# Patient Record
Sex: MALE | Race: WHITE | Employment: OTHER | URBAN - METROPOLITAN AREA
[De-identification: names, ages, dates, MRNs, and addresses within clinical notes are randomized per-mention and may not be internally consistent; named-entity substitution may affect disease eponyms.]

---

## 2018-03-26 ENCOUNTER — APPOINTMENT (OUTPATIENT)
Dept: GENERAL RADIOLOGY | Age: 63
End: 2018-03-26
Attending: EMERGENCY MEDICINE
Payer: SELF-PAY

## 2018-03-26 ENCOUNTER — HOSPITAL ENCOUNTER (EMERGENCY)
Age: 63
Discharge: HOME | End: 2018-03-26
Attending: EMERGENCY MEDICINE
Payer: SELF-PAY

## 2018-03-26 VITALS
BODY MASS INDEX: 34.07 KG/M2 | TEMPERATURE: 97.4 F | DIASTOLIC BLOOD PRESSURE: 70 MMHG | SYSTOLIC BLOOD PRESSURE: 152 MMHG | WEIGHT: 230 LBS | OXYGEN SATURATION: 100 % | RESPIRATION RATE: 18 BRPM | HEIGHT: 69 IN | HEART RATE: 90 BPM

## 2018-03-26 DIAGNOSIS — S46.219A BICEPS TENDON TEAR: Primary | ICD-10-CM

## 2018-03-26 PROCEDURE — 73060 X-RAY EXAM OF HUMERUS: CPT

## 2018-03-26 PROCEDURE — A4565 SLINGS: HCPCS

## 2018-03-26 PROCEDURE — 99282 EMERGENCY DEPT VISIT SF MDM: CPT

## 2018-03-26 NOTE — ED PROVIDER NOTES
HPI Comments: 63 y.o. 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 Pt reports he felt a \"rip\" to his right biceps while lifting an oven ~1.5 - 2 hours ago. He reports pain to the area at this time. He states his right biceps now appears depressed or \"sunken in.\" He reports hx of right shoulder surgery. Pt specifically denies any other injuries. There are no other acute medical concerns at this time. Social hx: former tobacco use, yes EtOH use  PCP: Leila Butler MD    Note written by Natalia Edwards, as dictated by Clayton Suazo MD 7:42 PM      The history is provided by the patient. No  was used. Past Medical History:   Diagnosis Date    Amputation finger     5th digit right hand       Past Surgical History:   Procedure Laterality Date    HX CARPAL TUNNEL RELEASE      HX ORTHOPAEDIC      Back surgery 1987, 2007    HX ORTHOPAEDIC      right shoulder    HX ORTHOPAEDIC      bone grafts bilateral feet         History reviewed. No pertinent family history. Social History     Social History    Marital status:      Spouse name: N/A    Number of children: N/A    Years of education: N/A     Occupational History    Not on file. Social History Main Topics    Smoking status: Former Smoker    Smokeless tobacco: Never Used    Alcohol use Yes    Drug use: Not on file    Sexual activity: Not on file     Other Topics Concern    Not on file     Social History Narrative         ALLERGIES: Percocet [oxycodone-acetaminophen]    Review of Systems   Constitutional: Negative for chills and fever. HENT: Negative for rhinorrhea and sore throat. Respiratory: Negative for cough and shortness of breath. Cardiovascular: Negative for chest pain. Gastrointestinal: Negative for abdominal pain, diarrhea, nausea and vomiting.    Genitourinary: Negative for dysuria and hematuria. Musculoskeletal: Positive for myalgias. Negative for arthralgias. Skin: Negative for pallor and rash. Neurological: Negative for dizziness, weakness and light-headedness. Vitals:    03/26/18 1913   BP: (!) 160/93   Pulse: 89   Resp: 18   Temp: 97.4 °F (36.3 °C)   SpO2: 99%   Weight: 104.3 kg (230 lb)   Height: 5' 9\" (1.753 m)            Physical Exam   Vital signs reviewed. Nursing notes reviewed. Const:  No acute distress, well developed, well nourished  Head:  Atraumatic, normocephalic  Eyes:  PERRL, conjunctiva normal, no scleral icterus  Neck:  Supple, trachea midline  Cardiovascular:  RRR, no murmurs, no gallops, no rubs  Resp:  No resp distress, no increased work of breathing, no wheezes, no rhonchi, no rales,  Abd:  Soft, non-tender, non-distended, no rebound, no guarding, no CVA tenderness  :  Deferred  MSK:  No pedal edema, normal ROM, tenderness at his biceps and over AC of R arm; deformity of bicep muscle, appears to be shortened  Neuro:  Alert and oriented x3, no cranial nerve defect  Skin:  Warm, dry, intact  Psych: normal mood and affect, behavior is normal, judgement and thought content is normal    Note written by Natalia Gamboa, as dictated by Cecile Vines MD 7:42 PM    MDM  Number of Diagnoses or Management Options  Biceps tendon tear:      Amount and/or Complexity of Data Reviewed  Clinical lab tests: ordered and reviewed  Tests in the radiology section of CPT®: ordered and reviewed  Review and summarize past medical records: yes    Patient Progress  Patient progress: stable      ED Course     Pt. Presents to the ER with arm pain and deformity. Exam is consistent with biceps tendon tear. No fx on xray. Pt. To f/u with ortho or return to the ER with worsening sx.     Procedures

## 2018-03-27 NOTE — ED NOTES
Discharge Instructions Reviewed with patient per MD. Discharge instructions given to patient per MD. Patient able to return verbalize discharge instructions. Paper copy of discharge instructions given. No RX given. Patient condition stable, Respiratory status WNL, Neurostatus intact.  Ambulatory out of er, to home with self

## 2018-04-05 NOTE — PERIOP NOTES
[address] [facility], [address]    MAIN OR [phone]    MAIN PRE OP [phone]    AMBULATORY PRE OP [phone]    PRE-ADMISSION TESTING [phone]       Surgery Date: Wednesday 4/11/18        Is surgery arrival time given by surgeon? NO  If NO, [place] staff will call you between 3 and 7pm the day before your surgery with your arrival time. (If your surgery is on a Monday, we will call you the Friday before.)    Call [phone] after 7pm Monday-Friday if you did not receive your arrival time.     Answers to Common Questions   When You  Arrive   Arrive at the [facility] [address] on the day of your surgery  Have your insurance card, photo ID, and any copayment (if needed)     Food   and   Drink   NO food or drink after midnight the night before surgery    This means NO water, gum, mints, coffee, juice, etc.  No alcohol (beer, wine, liquor) 24 hours before and after surgery     Medicine to   TAKE   Morning of Surgery   MEDICATIONS TO TAKE THE MORNING OF SURGERY WITH A SIP OF WATER:    Tylenol if needed     Medicine  To  STOP   FOR PAIN   NO Aspirin for pain    NO Non-Steroidal Anti-Inflammatory Drugs (NSAIDs:   for example, Ibuprofen (Advil, Motrin), Naproxen (Aleve)   STOP herbal supplements and vitamins 1 week before surgery   You can take Tylenol - follow instructions on the bottle     Blood  Thinners    If you take Aspirin, Plavix, Coumadin, blood-thinning or anti-clot medicine, talk to your surgeon and/or follow the instructions from the doctor who told you to take that medicine     Clothing  Jewelry  Valuables  Bathing     CLOTHING   Wear loose, comfortable clothes   Wear glasses instead of contacts   Leave money, jewelry and valuables at home   No make-up, particularly mascara, the day of surgery   REMOVE ALL piercings, rings, and jewelry - leave at home   Wear hair loose or down; no pony-tails, buns, or metal hair clips    BATHING   Follow all special bath instructions (for total joint replacement, spine and bowel surgeries.)   If you shower the morning of surgery, please do not apply any lotions, powders, or deodorants afterwards. Do not shave or trim anywhere 24 hours before surgery. Going Home  or  Spending the Night    SAME-DAY SURGERY: You must have a responsible adult drive you home and stay with you 24 hours after surgery   ADMITS: If your doctor is keeping you into the hospital after surgery, leave personal belongings/luggage in your car until you have a hospital room number. Hospital discharge time is 12 noon  Drivers must be here before 12 noon unless you are told differently         Follow all instructions so your surgery wont be cancelled. Please, be on time. If a situation occurs and you are delayed the day of surgery, call [phone] or [phone]. If your physical condition changes (like a fever, cold, flu, etc.) call your surgeon as soon as possible. The [facility] staff can be reached at [phone]. OTHER SPECIAL INSTRUCTIONS:  Free  parking 7am-5pm, bring CPAP on the day of surgery    The patient was contacted  via phone. He  verbalize  understanding of all instructions and does not  need reinforcement.

## 2018-04-10 ENCOUNTER — ANESTHESIA EVENT (OUTPATIENT)
Dept: SURGERY | Age: 63
End: 2018-04-10
Payer: COMMERCIAL

## 2018-04-11 ENCOUNTER — ANESTHESIA (OUTPATIENT)
Dept: SURGERY | Age: 63
End: 2018-04-11
Payer: COMMERCIAL

## 2018-04-11 ENCOUNTER — HOSPITAL ENCOUNTER (OUTPATIENT)
Age: 63
Setting detail: OUTPATIENT SURGERY
Discharge: HOME | End: 2018-04-11
Attending: ORTHOPAEDIC SURGERY | Admitting: ORTHOPAEDIC SURGERY
Payer: COMMERCIAL

## 2018-04-11 VITALS
BODY MASS INDEX: 34.07 KG/M2 | HEART RATE: 87 BPM | DIASTOLIC BLOOD PRESSURE: 90 MMHG | WEIGHT: 230 LBS | OXYGEN SATURATION: 97 % | SYSTOLIC BLOOD PRESSURE: 133 MMHG | TEMPERATURE: 97.7 F | RESPIRATION RATE: 23 BRPM | HEIGHT: 69 IN

## 2018-04-11 DIAGNOSIS — S46.211A TRAUMATIC RUPTURE OF DISTAL BICEPS TENDON, RIGHT, INITIAL ENCOUNTER: Primary | ICD-10-CM

## 2018-04-11 PROCEDURE — 74011000272 HC RX REV CODE- 272: Performed by: ORTHOPAEDIC SURGERY

## 2018-04-11 PROCEDURE — 74011250636 HC RX REV CODE- 250/636

## 2018-04-11 PROCEDURE — 77030000032 HC CUF TRNQT ZIMM -B: Performed by: ORTHOPAEDIC SURGERY

## 2018-04-11 PROCEDURE — 76030000003 HC AMB SURG OR TIME 1.5 TO 2: Performed by: ORTHOPAEDIC SURGERY

## 2018-04-11 PROCEDURE — 77030011640 HC PAD GRND REM COVD -A: Performed by: ORTHOPAEDIC SURGERY

## 2018-04-11 PROCEDURE — C1713 ANCHOR/SCREW BN/BN,TIS/BN: HCPCS | Performed by: ORTHOPAEDIC SURGERY

## 2018-04-11 PROCEDURE — 74011250636 HC RX REV CODE- 250/636: Performed by: ORTHOPAEDIC SURGERY

## 2018-04-11 PROCEDURE — 77030003601 HC NDL NRV BLK BBMI -A

## 2018-04-11 PROCEDURE — 77030020782 HC GWN BAIR PAWS FLX 3M -B

## 2018-04-11 PROCEDURE — 77030010514 HC APPL CLP LIG COVD -B: Performed by: ORTHOPAEDIC SURGERY

## 2018-04-11 PROCEDURE — 77030018836 HC SOL IRR NACL ICUM -A: Performed by: ORTHOPAEDIC SURGERY

## 2018-04-11 PROCEDURE — 77030002933 HC SUT MCRYL J&J -A: Performed by: ORTHOPAEDIC SURGERY

## 2018-04-11 PROCEDURE — 74011250636 HC RX REV CODE- 250/636: Performed by: ANESTHESIOLOGY

## 2018-04-11 PROCEDURE — 76060000063 HC AMB SURG ANES 1.5 TO 2 HR: Performed by: ORTHOPAEDIC SURGERY

## 2018-04-11 PROCEDURE — 77030035816 HC BIT DRL JGERKNT BIOM -C: Performed by: ORTHOPAEDIC SURGERY

## 2018-04-11 PROCEDURE — 77030031139 HC SUT VCRL2 J&J -A: Performed by: ORTHOPAEDIC SURGERY

## 2018-04-11 PROCEDURE — 76210000050 HC AMBSU PH II REC 0.5 TO 1 HR: Performed by: ORTHOPAEDIC SURGERY

## 2018-04-11 PROCEDURE — 74011000250 HC RX REV CODE- 250: Performed by: ORTHOPAEDIC SURGERY

## 2018-04-11 PROCEDURE — 77030011283 HC ELECTRD NDL COVD -A: Performed by: ORTHOPAEDIC SURGERY

## 2018-04-11 DEVICE — IMPLANTABLE DEVICE
Type: IMPLANTABLE DEVICE | Site: ARM | Status: FUNCTIONAL
Brand: JUGGERKNOT SOFT ANCHORS

## 2018-04-11 RX ORDER — ROPIVACAINE HYDROCHLORIDE 5 MG/ML
INJECTION, SOLUTION EPIDURAL; INFILTRATION; PERINEURAL AS NEEDED
Status: DISCONTINUED | OUTPATIENT
Start: 2018-04-11 | End: 2018-04-11 | Stop reason: HOSPADM

## 2018-04-11 RX ORDER — NALOXONE HYDROCHLORIDE 0.4 MG/ML
0.2 INJECTION, SOLUTION INTRAMUSCULAR; INTRAVENOUS; SUBCUTANEOUS
Status: DISCONTINUED | OUTPATIENT
Start: 2018-04-11 | End: 2018-04-11 | Stop reason: HOSPADM

## 2018-04-11 RX ORDER — FENTANYL CITRATE 50 UG/ML
INJECTION, SOLUTION INTRAMUSCULAR; INTRAVENOUS AS NEEDED
Status: DISCONTINUED | OUTPATIENT
Start: 2018-04-11 | End: 2018-04-11 | Stop reason: HOSPADM

## 2018-04-11 RX ORDER — FLUMAZENIL 0.1 MG/ML
0.2 INJECTION INTRAVENOUS
Status: DISCONTINUED | OUTPATIENT
Start: 2018-04-11 | End: 2018-04-11 | Stop reason: HOSPADM

## 2018-04-11 RX ORDER — SODIUM CHLORIDE 0.9 % (FLUSH) 0.9 %
5-10 SYRINGE (ML) INJECTION AS NEEDED
Status: DISCONTINUED | OUTPATIENT
Start: 2018-04-11 | End: 2018-04-11 | Stop reason: HOSPADM

## 2018-04-11 RX ORDER — MIDAZOLAM HYDROCHLORIDE 1 MG/ML
INJECTION, SOLUTION INTRAMUSCULAR; INTRAVENOUS AS NEEDED
Status: DISCONTINUED | OUTPATIENT
Start: 2018-04-11 | End: 2018-04-11 | Stop reason: HOSPADM

## 2018-04-11 RX ORDER — CEFAZOLIN SODIUM/WATER 2 G/20 ML
2 SYRINGE (ML) INTRAVENOUS ONCE
Status: COMPLETED | OUTPATIENT
Start: 2018-04-11 | End: 2018-04-11

## 2018-04-11 RX ORDER — PROMETHAZINE HYDROCHLORIDE 25 MG/1
TABLET ORAL
Qty: 30 TAB | Refills: 0 | Status: ON HOLD
Start: 2018-04-11 | End: 2021-02-19

## 2018-04-11 RX ORDER — SODIUM CHLORIDE, SODIUM LACTATE, POTASSIUM CHLORIDE, CALCIUM CHLORIDE 600; 310; 30; 20 MG/100ML; MG/100ML; MG/100ML; MG/100ML
125 INJECTION, SOLUTION INTRAVENOUS CONTINUOUS
Status: DISCONTINUED | OUTPATIENT
Start: 2018-04-11 | End: 2018-04-11 | Stop reason: HOSPADM

## 2018-04-11 RX ORDER — LIDOCAINE HYDROCHLORIDE 10 MG/ML
0.1 INJECTION, SOLUTION EPIDURAL; INFILTRATION; INTRACAUDAL; PERINEURAL AS NEEDED
Status: DISCONTINUED | OUTPATIENT
Start: 2018-04-11 | End: 2018-04-11 | Stop reason: HOSPADM

## 2018-04-11 RX ORDER — PROPOFOL 10 MG/ML
INJECTION, EMULSION INTRAVENOUS
Status: DISCONTINUED | OUTPATIENT
Start: 2018-04-11 | End: 2018-04-11 | Stop reason: HOSPADM

## 2018-04-11 RX ORDER — DIPHENHYDRAMINE HYDROCHLORIDE 50 MG/ML
12.5 INJECTION, SOLUTION INTRAMUSCULAR; INTRAVENOUS AS NEEDED
Status: DISCONTINUED | OUTPATIENT
Start: 2018-04-11 | End: 2018-04-11 | Stop reason: HOSPADM

## 2018-04-11 RX ORDER — SODIUM CHLORIDE 0.9 % (FLUSH) 0.9 %
5-10 SYRINGE (ML) INJECTION EVERY 8 HOURS
Status: DISCONTINUED | OUTPATIENT
Start: 2018-04-11 | End: 2018-04-11 | Stop reason: HOSPADM

## 2018-04-11 RX ORDER — HYDROMORPHONE HYDROCHLORIDE 2 MG/1
TABLET ORAL
Qty: 40 TAB | Refills: 0 | Status: ON HOLD
Start: 2018-04-11 | End: 2021-02-19

## 2018-04-11 RX ORDER — HYDROMORPHONE HYDROCHLORIDE 2 MG/ML
.25-1 INJECTION, SOLUTION INTRAMUSCULAR; INTRAVENOUS; SUBCUTANEOUS
Status: DISCONTINUED | OUTPATIENT
Start: 2018-04-11 | End: 2018-04-11 | Stop reason: HOSPADM

## 2018-04-11 RX ADMIN — SODIUM CHLORIDE, SODIUM LACTATE, POTASSIUM CHLORIDE, AND CALCIUM CHLORIDE 125 ML/HR: 600; 310; 30; 20 INJECTION, SOLUTION INTRAVENOUS at 13:15

## 2018-04-11 RX ADMIN — FENTANYL CITRATE 25 MCG: 50 INJECTION, SOLUTION INTRAMUSCULAR; INTRAVENOUS at 14:56

## 2018-04-11 RX ADMIN — PROPOFOL 50 MCG/KG/MIN: 10 INJECTION, EMULSION INTRAVENOUS at 14:24

## 2018-04-11 RX ADMIN — MIDAZOLAM HYDROCHLORIDE 2 MG: 1 INJECTION, SOLUTION INTRAMUSCULAR; INTRAVENOUS at 13:32

## 2018-04-11 RX ADMIN — FENTANYL CITRATE 25 MCG: 50 INJECTION, SOLUTION INTRAMUSCULAR; INTRAVENOUS at 15:25

## 2018-04-11 RX ADMIN — FENTANYL CITRATE 25 MCG: 50 INJECTION, SOLUTION INTRAMUSCULAR; INTRAVENOUS at 14:54

## 2018-04-11 RX ADMIN — FENTANYL CITRATE 50 MCG: 50 INJECTION, SOLUTION INTRAMUSCULAR; INTRAVENOUS at 13:32

## 2018-04-11 RX ADMIN — MIDAZOLAM HYDROCHLORIDE 2 MG: 1 INJECTION, SOLUTION INTRAMUSCULAR; INTRAVENOUS at 14:26

## 2018-04-11 RX ADMIN — ROPIVACAINE HYDROCHLORIDE 30 ML: 5 INJECTION, SOLUTION EPIDURAL; INFILTRATION; PERINEURAL at 13:40

## 2018-04-11 RX ADMIN — Medication 2 G: at 14:21

## 2018-04-11 RX ADMIN — FENTANYL CITRATE 50 MCG: 50 INJECTION, SOLUTION INTRAMUSCULAR; INTRAVENOUS at 13:35

## 2018-04-11 RX ADMIN — FENTANYL CITRATE 25 MCG: 50 INJECTION, SOLUTION INTRAMUSCULAR; INTRAVENOUS at 15:17

## 2018-04-11 NOTE — DISCHARGE INSTRUCTIONS
MD Dr. Thony Hensley Dr., MD Dr. Jaqueline Corn. Mahendra Cyr MD    You have undergone surgery by one of our hand specialists. Please follow these instructions to ensure a safe and speedy recovery. 1. SURGICAL DRESSING (bandage): Your bandage should be kept dry and in place until you return to the office for your follow up visit. This helps to guard against infection. [x]         You can shower if you place a plastic bag over your dressing.    []         You will need to sponge bathe until you are seen in the office. 2. ELEVATION:  It is VERY IMPORTANT that you keep your arm and hand above the level of your heart at all times, awake or asleep. The higher you elevate your hand, the less it will swell, and the less it will hurt. It is best to elevate the hand as shown below:              Laying down, especially at night,  with your arm elevated on pillows help keep your shoulder and elbow from getting stiff. 3. Ice bags / ice packs can be used to help control pain. If you make your own ice bag, double-bagging helps to prevent leaks. 4. MEDICATIONS:  You have been given a prescription for pain medications. Take it according to the instructions on the bottle. DO NOT drink alcohol while taking pain medications. DO NOT drive, operate heavy machinery, or make important personal or business decisions since the medications will make you drowsy. We do NOT refill prescriptions over the weekend. Please arrange to call for prescription refills during regular office hours. 5. APPOINTMENT:  Your post operative appointment has been made for the following time:    TIME:   3:00pm          DATE:   4/23/18         At the:          [x]  [address] [place] [address] Office                                        HAND THERAPY:    4/16/18-  10:00am-   [address] [place] [address] Office      6.  AFTER GENERAL ANESTHESIA or IV SEDATION:   DO NOT drink alcohol or drive, work around machinery, or make important personal or business decisions. Limit your activity for 24 hours. Resume your diet with light foods (Jell-o ®, clear soups, clear fluids, caffeine-free drinks). If you do not have any nausea, you may resume your regular diet. We at [address] [place] [address] want your surgery and recovery to be as comfortable and successful as possible. Should you have problems, please call our office during the morning hours. In particular, call if your pain is not adequately controlled by prescribed medication, temperature is over 101 degrees, or your bandage is wet or has a four odor. Your doctor contact information:   Kristie Lopez [phone]  Alyssa Nunez, ext [phone] [facility] END OFFICE - [address] [facility], [address]  [place], [address] from Your Nurse    PATIENT INSTRUCTIONS:    AFTER ANESTHESIA & SEDATION, and WHILE TAKING PAIN MEDICINE  After general anesthesia / intravenous sedation and the 24 hours following, and/or while taking prescription Opiates:  · Limit your activities  · Do not drive and operate hazardous machinery until you have been of all narcotics and sedatives for over 24 hours  · Do not make important personal or business decisions  · Do  not drink alcoholic beverages  · If you have not urinated within 8 hours after discharge, please contact your surgeon on call.       SIGNS OF INFECTION  Report the following Signs of Infection or General Problems after surgery to your surgeon:  · Excessive pain, swelling, redness or odor of or around the surgical area  · Temperature over 101; Temperature over 100 if on medications that affect your ability to fight infections  · Nausea and vomiting lasting longer than 4 hours or if unable to take medications  · Any signs of decreased circulation or nerve impairment to extremity: change in color, persistent  numbness, tingling, coldness or increase pain  · If you have any questions. COUGH AND DEEP BREATHE  Breathing deep and coughing are very important exercises to do after surgery. Deep breathing and coughing open the little air tubes and air sacks in your lungs. You take deep breaths every day. You may not even notice - it is just something you do when you sigh or yawn. It is a natural exercise you do to keep these air passages open. After surgery, take deep breaths and cough, on purpose. Coughing and deep breathing help prevent bronchitis and pneumonia after surgery. If you had chest or belly surgery, use a pillow as a \"hug dash\" and hold it tightly to your chest or belly when you cough. DIRECTIONS:  1. Take 10 to 15 slow deep breaths every hour while awake. 2. Breathe in deeply, and hold it for 2 seconds. 3. Exhale slowly through puckered lips, like blowing up a balloon. 4. After every 4th or 5th deep breath, hug your pillow to your chest or belly and give a hard, deep cough. Yes, it will probably hurt. But doing this exercise is very important part of healing after surgery. Take your pain medicine to help you do this exercise without too much pain. ANKLE PUMPS    Ankle pumps increase the circulation of oxygenated blood to your lower extremities and decrease your risk for circulation problems such as blood clots. They also stretch the muscles, tendons and ligaments in your foot and ankle, and prevent joint contracture in the ankle and foot, especially after surgeries on the legs. It is important to do ankle pump exercises regularly after surgery because immobility increases your risk for developing a blood clot. Your doctor may also have you take an Aspirin for the next few days as well. If your doctor did not ask you to take an Aspirin, consult with him before starting Aspirin therapy on your own. The exercise is quite simple.      · Slowly point your foot forward, feeling the muscles on the top of your lower leg stretch, and hold this position for 5 seconds. · Next, pull your foot back toward you as far as possible, stretching the calf muscles, and hold that position for 5 seconds. · Repeat with the other foot. · Perform 10 repetitions every hour while awake for both ankles if possible (down and then up with the foot once is one repetition). You should feel gentle stretching of the muscles in your lower leg when doing this exercise. If you feel pain, or your range of motion is limited, don't push too hard. Only go the limit your joint and muscles will let you go. If you have increasing pain, progressively worsening leg warmth or swelling, STOP the exercise and call your doctor. Other Wound Care information:           Going Home After A  Peripheral Nerve Block    Patient Information    The anesthesiology team has provided for your pain control through a technique called regional anesthesia. As the name implies, anesthesia (decreased or no pain, sensation, or motor control) has been provided to a specific region, whether that be an arm or a leg. How does this happen?  you might ask. While you were sleepy, the anesthesia provider provided medicine to a specific group of nerves either in the neck/shoulder region or in the groin and/or buttock region, similar to the way a dentist might numb a tooth (teeth.)  They typically use an ultrasound machine to know where the medicine is placed in relation to the nerves they wish to numb up or block.   What this means is that for the next few hours, you should expect to have a numb limb. Below are some things we wish for you to read and be familiar with concerning your anesthetized limb. Caring For a Blocked Body Part    General Considerations:   The numbness may last up to 24 hours   You must protect your arm or leg.   The blocked extremity is numb, weak, and difficult for your brain to locate and communicate with.  To do this you should:  o Pay attention to the position of the blocked limb at all times. o Be very careful when placing hot or cod items on a numb limb. You could cause tissue damage like burns or frostbite if you are unable to feel temperature. Carefully follow your discharge instructions regarding the use of these therapies in you post-operative care. o Carefully pad the affected limb. Normally we continually move and adjust the position of our bodies without thinking about it. This movement and continuous repositioning helps to prevent injuries from immobility. When a limb is numb it still requires this care  o Be extremely careful not to bump or hit the numb body part. This can result in an unrecognized injury, at lease until the blocked limb wakes up. It can also result in worse pain of your already post-surgical limb. Arm/Shoulder Blocks:   You may experience a droopy eyelid, nasal stuffiness, and redness of the eye after receiving an arm/shoulder block. This is called Jonass Syndrome, and is very common. There is no need for concern. You may also experience mild hoarseness, but all of these symptoms should resolve within 24 hours.  Some patients may experience mild shortness of breath. A sitting position will help alleviate this and it should resolve within 24 hours. If you experience significant or progressive worsening of the shortness of breath, seek medical attention immediately. Knee/Foot Blocks:   DO NOT use the blocked leg to walk, balance or support yourself. Your leg will not be able to balance your weight properly while any part of your leg is numb. You are at a RISK for [address].  Be careful not to drag your foot along the ground or stub your toes. Contact Phone Numbers    CALL 911 IN CASE OF AN EMERGENCY.     For all other non-emergency inquiries call the [facility]  at [phone] and ask for the anesthesiologist on call to be paged.                          Below is information on the medication(s) your doctor is prescribing for you: The maximum daily dose of acetaminophen was discussed with the patient. He was encouraged not to exceed 3,000 mg of acetaminophen during a 24 hour period and was asked to keep in mind that acetaminophen can also be found in many over-the-counter cold medications as well as narcotics that may be given for pain. The patient expresses understanding of these issues and questions were answered. 4 THINGS ABOUT PAIN MEDICINE I ALWAYS TALK ABOUT:  There are 4 side effects I always talk about for pain medications. 1. They make you sleepy and drowsy. Do not drive a car or operate machines while taking pain medication. Do not make any major decisions. Take a nap. Relax. Let your body recover from the affects of anesthesia and surgery. 2. Some people have quite a problem with itching and. ..  3. Nausea or vomiting. I mention these together because research tends to suggest there is a gene-related issue. While some have a hard time with these problems, others do not. These are expected and know side effects. Over-the-counter Benadryl® (the drug name is diphenhydramine) can help with the itching. Your doctor may also have given you some medicine for nausea. IF HE DID NOT, CALL HIM/HER. 4. Last but not least is the problem of constipation (not franky able to have a bowel movement - poop.)  All pain medicine can slow down the movement of food through the gut. The slower it goes, the worse it can be. Frankly, this means hard poop adding insult to the injury of surgery. And if you had tummy surgery, like having your gall bladder removed or a hernia repair, YOU DO NOT WANT THIS PROBLEM. There are 4 things I recommend. · Drink lots of fluids. For healthy people with no heart problems, this means at least 64 ounces of liquids or more per day.   For example, a Big Gulp® from [employer] is 28 ounces. So you need to drink at least 2  Big Gulp®'s of fluids every day. If you have heart problems you may not be able to do this. Talk to your doctor about what you should do to prevent constipation. · Drinking fruit juice like apple, pear, or prune juice gives you extra \"BANG\" for your beverage. These drinks are high in natural fiber. If you are a diabetic, drink sugar-free fluids with fiber additives (see next 2 points.)  Avoid drinking extra fruit juice unless this is a regular part of your diet plan. · Eat extra fresh fruits and vegetables. · Add extra fiber-products. Fiber products like Metamucil®, Citrucel®, Miralax® or Benefiber® can help. These products are over-the-counter and you do not need a prescription from your doctor. If you have followed these recommendations and still have some difficulty having a good poop, take and over-the-counter stimulant like Dulcolax® (biscodyl)  or Senokot® (senna concentrate). These may help get things moving. Addison Beckman MEDICATION AND   SIDE EFFECT GUIDE    The [facility] Insurance MEDICATION AND SIDE EFFECT GUIDE was provided to the PATIENT AND CARE PROVIDER. Information provided includes instruction about drug purpose and common side effects for the following medications:   · Dilaudid  · Phenergan      Medication information added to discharge record on April 11, 2018 at 4:06 PM.      Some information we wish all of our patients to be familiar with and General Information for Healthy Lifestyle choices:    · Make a list of your current medications with your Primary Care Provider. · Update this list whenever your medications are discontinued, doses are changed, or new medications (including over-the-counter products like ibuprofen, vitamins, or herbal remedies) are added.   · Carry medication information at all times in case of emergency situations      No smoking / No tobacco products / Avoid exposure to second hand smoke    Surgeon Kaden Mccoy Warning:  Quitting smoking now greatly reduces serious risk to your health. Obesity, smoking, and sedentary lifestyle greatly increases your risk for illness. A healthy diet, regular physical exercise & weight monitoring are important for maintaining a healthy lifestyle. A Note About Congestive Heart Failure: You may be retaining fluid if you have a history of heart failure or if you experience any of the following symptoms:      · Weight gain of 3 pounds or more overnight or 5 pounds in a week  · Increased swelling in our hands or feet  · Shortness of breath while lying flat in bed      Please call your doctor as soon as you notice any of these symptoms; do not wait until your next office visit. A Note About Strokes:  Recognize signs and symptoms of STROKE. The simple mnemonic, F.A.S.T., can help you remember signs of a stroke and what to do if you suspect a stroke is occuring to you or someone you are with:    F - Face looks uneven  A - Arms unable to move, or move evenly  S - Speech is slurred or non-existent  T - Time - CALL 911 as soon as signs and symptoms begin - DO NOT go to bed or wait to see if you get better - TIME IS BRAIN. Warning Signs of HEART ATTACK   Call 911 if you have these symptoms:     Chest discomfort. Most heart attacks involve discomfort in the center of the chest that lasts more than a few minutes, or that goes away and comes back. It can feel like uncomfortable pressure, squeezing, fullness, or pain.  Discomfort in other areas of the upper body. Symptoms can include pain or discomfort in one or both arms, the back, neck, jaw, or stomach.  Shortness of breath with or without chest discomfort.  Other signs may include breaking out in a cold sweat, nausea, or lightheadedness. Don't wait more than five minutes to call 911 - MINUTES MATTER! Fast action can save your life. Calling 911 is almost always the fastest way to get lifesaving treatment.  Emergency Medical Services staff can begin treatment when they arrive -- up to an hour sooner than if someone gets to the hospital by car. AT THE COMPLETION OF DISCHARGE INSTRUCTION REVIEW, WE VERIFY:  The discharge information has been reviewed with the patient and caregiver. Questions have been asked and answered meeting patient and caregiver expectations. The patient and caregiver verbalized understanding. Your discharge nurse was Yuliana RUIZ, RN-BC       Board Certified - Pain Management      Other information found in your discharge envelope:  [x]     PRESCRIPTIONS     [] Sent electronically to your pharmacy  []     PHYSICAL THERAPY PRESCRIPTION  []     APPOINTMENT CARDS  []     Regional Anesthesia Pamphlet for block or block with On-Q Catheter from Anesthesia  Service  []     Medical device information sheets/pamphlets from their    []     School/work excuse note. []     /parent work excuse note. The following personal items collected during your admission for safe keeping are returned to you:     Dental Appliance: Dental Appliances: None  Vi yuliana: Visual Aid: Glasses  Hearing Aid:    Jewelry:    Clothing: Clothing:  (denies valuables. clothing to locker)  Other Valuables:    Valuables sent to safe:

## 2018-04-11 NOTE — H&P
Date of Surgery Update:  Anastacia Pang was seen and examined. History and physical has been reviewed. The patient has been examined.  There have been no significant clinical changes since the completion of the originally dated History and Physical.    Signed By: RANJAN Yanez     April 11, 2018 1:54 PM

## 2018-04-11 NOTE — IP AVS SNAPSHOT
[address] [facility] [address] 
 
 
 [address] [facility] [address] [facility] 
[phone] Patient: Soo Bustamante MRN: [id number] LDI:1/0/1021 About your hospitalization You were admitted on:  April 11, 2018 You last received care in the:  [facility] PACU You were discharged on:  April 11, 2018 Why you were hospitalized Your primary diagnosis was:  Not on File Follow-up Information Follow up With Details Comments Contact Info Braxton Almaraz MD   [address] [facility] [address] 
[phone] Jasmin Echols MD Go on 4/16/2018 for HAND PT - 10am 
 
FOLLOW UP with Dr Gabriel Dooley 4/23/18 - 3PM [address] [facility] [address] [facility] 
[phone] Discharge Orders None A check candi indicates which time of day the medication should be taken. My Medications START taking these medications Instructions Each Dose to Equal  
 Morning Noon Evening Bedtime HYDROmorphone 2 mg tablet Commonly known as:  DILAUDID Your last dose was: Your next dose is:    
   
   
 1-2 tabs PO Q 4- 6hrs PRN  
     
   
   
   
  
 promethazine 25 mg tablet Commonly known as:  PHENERGAN Your last dose was: Your next dose is:    
   
   
 1 tab Po Q 4-6 hrs PRN nausea CONTINUE taking these medications Instructions Each Dose to Equal  
 Morning Noon Evening Bedtime TYLENOL 325 mg tablet Generic drug:  acetaminophen Your last dose was: Your next dose is: Take  by mouth every four (4) hours as needed for Pain. Where to Get Your Medications Information on where to get these meds will be given to you by the nurse or doctor. ! Ask your nurse or doctor about these medications HYDROmorphone 2 mg tablet  
 promethazine 25 mg tablet Opioid Education Prescription Opioids: What You Need to Know: 
 
 
You have undergone surgery by one of our hand specialists. Please follow these instructions to ensure a safe and speedy recovery. 1. SURGICAL DRESSING (bandage): Your bandage should be kept dry and in place until you return to the office for your follow up visit. This helps to guard against infection. [x]         You can shower if you place a plastic bag over your dressing. 
  []         You will need to sponge bathe until you are seen in the office. 2. ELEVATION:  It is VERY IMPORTANT that you keep your arm and hand above the level of your heart at all times, awake or asleep. The higher you elevate your hand, the less it will swell, and the less it will hurt. It is best to elevate the hand as shown below: 
 
      
  
Laying down, especially at night,  with your arm elevated on pillows help keep your shoulder and elbow from getting stiff. 3. Ice bags / ice packs can be used to help control pain. If you make your own ice bag, double-bagging helps to prevent leaks. 4. MEDICATIONS:  You have been given a prescription for pain medications. Take it according to the instructions on the bottle. DO NOT drink alcohol while taking pain medications. DO NOT drive, operate heavy machinery, or make important personal or business decisions since the medications will make you drowsy. We do NOT refill prescriptions over the weekend. Please arrange to call for prescription refills during regular office hours. 5. APPOINTMENT:  Your post operative appointment has been made for the following time: TIME:   3:00pm          DATE:   4/23/18         At the:          [x]  [facility] Office HAND THERAPY:    4/16/18-  10:00am-   [facility] Office 6. AFTER GENERAL ANESTHESIA or IV SEDATION:   DO NOT drink alcohol or drive, work around [facility] 24, or make important personal or business decisions. Limit your activity for 24 hours. Resume your diet with light foods (Jell-o ®, clear soups, clear fluids, caffeine-free drinks). If you do not have any nausea, you may resume your regular diet. We at [address] [place] [address] want your surgery and recovery to be as comfortable and successful as possible. Should you have problems, please call our office during the morning hours. In particular, call if your pain is not adequately controlled by prescribed medication, temperature is over 101 degrees, or your bandage is wet or has a four odor. Your doctor contact information:  
? [phone] 
? [phone], [phone] [facility] OFFICE - [address] [facility] OFFICE - [address] [facility]. [address]  [address] DISCHARGE SUMMARY from Your Nurse PATIENT INSTRUCTIONS: 
 
AFTER ANESTHESIA & SEDATION, and WHILE TAKING PAIN MEDICINE After general anesthesia / intravenous sedation and the 24 hours following, and/or while taking prescription Opiates: · Limit your activities · Do not drive and operate hazardous machinery until you have been of all narcotics and sedatives for over 24 hours · Do not make important personal or business decisions · Do  not drink alcoholic beverages · If you have not urinated within 8 hours after discharge, please contact your surgeon on call. SIGNS OF INFECTION Report the following Signs of Infection or General Problems after surgery to your surgeon: 
· Excessive pain, swelling, redness or odor of or around the surgical area · Temperature over 101; Temperature over 100 if on medications that affect your ability to fight infections · Nausea and vomiting lasting longer than 4 hours or if unable to take medications · Any signs of decreased circulation or nerve impairment to extremity: change in color, persistent  numbness, tingling, coldness or increase pain · If you have any questions. [address] Breathing deep and coughing are very important exercises to do after surgery. Deep breathing and coughing open the little air tubes and air sacks in your lungs. You take deep breaths every day. You may not even notice - it is just something you do when you sigh or yawn. It is a natural exercise you do to keep these air passages open. After surgery, take deep breaths and cough, on purpose. Coughing and deep breathing help prevent bronchitis and pneumonia after surgery. If you had chest or belly surgery, use a pillow as a \"hug dash\" and hold it tightly to your chest or belly when you cough. DIRECTIONS: 
1. Take 10 to 15 slow deep breaths every hour while awake. 2. Breathe in deeply, and hold it for 2 seconds. 3. Exhale slowly through puckered lips, like blowing up a balloon. 4. After every 4th or 5th deep breath, hug your pillow to your chest or belly and give a hard, deep cough. Yes, it will probably hurt. But doing this exercise is very important part of healing after surgery. Take your pain medicine to help you do this exercise without too much pain. ANKLE PUMPS Ankle pumps increase the circulation of oxygenated blood to your lower extremities and decrease your risk for circulation problems such as blood clots. They also stretch the muscles, tendons and ligaments in your foot and ankle, and prevent joint contracture in the ankle and foot, especially after surgeries on the legs.  
 
It is important to do ankle pump exercises regularly after surgery because immobility increases your risk for developing a blood clot. Your doctor may also have you take an Aspirin for the next few days as well. If your doctor did not ask you to take an Aspirin, consult with him before starting Aspirin therapy on your own. The exercise is quite simple. · Slowly point your foot forward, feeling the muscles on the top of your lower leg stretch, and hold this position for 5 seconds. · Next, pull your foot back toward you as far as possible, stretching the calf muscles, and hold that position for 5 seconds. · Repeat with the other foot. · Perform 10 repetitions every hour while awake for both ankles if possible (down and then up with the foot once is one repetition). You should feel gentle stretching of the muscles in your lower leg when doing this exercise. If you feel pain, or your range of motion is limited, don't push too hard. Only go the limit your joint and muscles will let you go. If you have increasing pain, progressively worsening leg warmth or swelling, STOP the exercise and call your doctor. Other Wound Care information: 
        
Going Home After A Peripheral Nerve Block Patient Information The anesthesiology team has provided for your pain control through a technique called regional anesthesia. As the name implies, anesthesia (decreased or no pain, sensation, or motor control) has been provided to a specific region, whether that be an arm or a leg. How does this happen?  you might ask. While you were sleepy, the anesthesia provider provided medicine to a specific group of nerves either in the neck/shoulder region or in the groin and/or buttock region, similar to the way a dentist might numb a tooth (teeth.)  They typically use an ultrasound machine to know where the medicine is placed in relation to the nerves they wish to numb up or block.   What this means is that for the next few hours, you should expect to have a numb limb. Below are some things we wish for you to read and be familiar with concerning your anesthetized limb. Caring For a Blocked Body Part General Considerations: ? The numbness may last up to 24 hours ? You must protect your arm or leg. The blocked extremity is numb, weak, and difficult for your brain to locate and communicate with. To do this you should: 
o Pay attention to the position of the blocked limb at all times. o Be very careful when placing hot or cod items on a numb limb. You could cause tissue damage like burns or frostbite if you are unable to feel temperature. Carefully follow your discharge instructions regarding the use of these therapies in you post-operative care. o Carefully pad the affected limb. Normally we continually move and adjust the position of our bodies without thinking about it. This movement and continuous repositioning helps to prevent injuries from immobility. When a limb is numb it still requires this care 
o Be extremely careful not to bump or hit the numb body part. This can result in an unrecognized injury, at lease until the blocked limb wakes up. It can also result in worse pain of your already post-surgical limb. Arm/Shoulder Blocks: 
? You may experience a droopy eyelid, nasal stuffiness, and redness of the eye after receiving an arm/shoulder block. This is called Jonass Syndrome, and is very common. There is no need for concern. You may also experience mild hoarseness, but all of these symptoms should resolve within 24 hours. ? Some patients may experience mild shortness of breath. A sitting position will help alleviate this and it should resolve within 24 hours. If you experience significant or progressive worsening of the shortness of breath, seek medical attention immediately. Knee/Foot Blocks: 
? DO NOT use the blocked leg to walk, balance or support yourself.  Your leg will not be able to balance your weight properly while any part of your leg is numb. You are at a RISK for [address]. ? Be careful not to drag your foot along the ground or stub your toes. Contact Phone Numbers CALL 911 IN CASE OF AN EMERGENCY. For all other non-emergency inquiries call the [facility]  at [phone] and ask for the anesthesiologist on call to be paged. Below is information on the medication(s) your doctor is prescribing for you: The maximum daily dose of acetaminophen was discussed with the patient. He was encouraged not to exceed 3,000 mg of acetaminophen during a 24 hour period and was asked to keep in mind that acetaminophen can also be found in many over-the-counter cold medications as well as narcotics that may be given for pain. The patient expresses understanding of these issues and questions were answered. 4 THINGS ABOUT PAIN MEDICINE I ALWAYS TALK ABOUT: 
There are 4 side effects I always talk about for pain medications. 1. They make you sleepy and drowsy. Do not drive a car or operate machines while taking pain medication. Do not make any major decisions. Take a nap. Relax. Let your body recover from the affects of anesthesia and surgery. 2. Some people have quite a problem with itching and. .. 3. Nausea or vomiting. I mention these together because research tends to suggest there is a gene-related issue. While some have a hard time with these problems, others do not. These are expected and know side effects. Over-the-counter Benadryl® (the drug name is diphenhydramine) can help with the itching. Your doctor may also have given you some medicine for nausea. IF HE DID NOT, CALL HIM/HER. 4. Last but not least is the problem of constipation (not franky able to have a bowel movement - poop.)  All pain medicine can slow down the movement of food through the gut.   The slower it goes, the worse it can be.  Frankly, this means hard poop adding insult to the injury of surgery. And if you had tummy surgery, like having your gall bladder removed or a hernia repair, YOU DO NOT WANT THIS PROBLEM. There are 4 things I recommend. · Drink lots of fluids. For healthy people with no heart problems, this means at least 64 ounces of liquids or more per day. For example, a Big Gulp® from [employer] is 32 ounces. So you need to drink at least 2  Big Gulp®'s of fluids every day. If you have heart problems you may not be able to do this. Talk to your doctor about what you should do to prevent constipation. · Drinking fruit juice like apple, pear, or prune juice gives you extra \"BANG\" for your beverage. These drinks are high in natural fiber. If you are a diabetic, drink sugar-free fluids with fiber additives (see next 2 points.)  Avoid drinking extra fruit juice unless this is a regular part of your diet plan. · Eat extra fresh fruits and vegetables. · Add extra fiber-products. Fiber products like Metamucil®, Citrucel®, Miralax® or Benefiber® can help. These products are over-the-counter and you do not need a prescription from your doctor. If you have followed these recommendations and still have some difficulty having a good poop, take and over-the-counter stimulant like Dulcolax® (biscodyl)  or Senokot® (senna concentrate). These may help get things moving. Renetta Hall[address] 55 EFFECT GUIDE The [address] EFFECT GUIDE was provided to the PATIENT AND CARE PROVIDER. Information provided includes instruction about drug purpose and common side effects for the following medications: · Dilaudid · Phenergan Medication information added to discharge record on April 11, 2018 at 4:06 PM. 
 
 
Some information we wish all of our patients to be familiar with and General Information for Healthy Lifestyle choices: · Make a list of your current medications with your Primary Care Provider. · Update this list whenever your medications are discontinued, doses are changed, or new medications (including over-the-counter products like ibuprofen, vitamins, or herbal remedies) are added. · Carry medication information at all times in case of emergency situations No smoking / No tobacco products / Avoid exposure to second hand smoke Surgeon General's Warning:  Quitting smoking now greatly reduces serious risk to your health. Obesity, smoking, and sedentary lifestyle greatly increases your risk for illness. A healthy diet, regular physical exercise & weight monitoring are important for maintaining a healthy lifestyle. A Note About Congestive Heart Failure: You may be retaining fluid if you have a history of heart failure or if you experience any of the following symptoms:   
 
· Weight gain of 3 pounds or more overnight or 5 pounds in a week · Increased swelling in our hands or feet · Shortness of breath while lying flat in bed Please call your doctor as soon as you notice any of these symptoms; do not wait until your next office visit. A Note About Strokes: 
Recognize signs and symptoms of STROKE. The simple mnemonic, F.A.S.T., can help you remember signs of a stroke and what to do if you suspect a stroke is occuring to you or someone you are with: 
 
F - Face looks uneven A - Arms unable to move, or move evenly S - Speech is slurred or non-existent T - Time - CALL 911 as soon as signs and symptoms begin - DO NOT go to bed or wait to see if you get better - TIME IS BRAIN. Warning Signs of HEART ATTACK Call 911 if you have these symptoms: 
 
? Chest discomfort. Most heart attacks involve discomfort in the center of the chest that lasts more than a few minutes, or that goes away and comes back. It can feel like uncomfortable pressure, squeezing, fullness, or pain. ? Discomfort in other areas of the upper body. Symptoms can include pain or discomfort in one or both arms, the back, neck, jaw, or stomach. ? Shortness of breath with or without chest discomfort. ? Other signs may include breaking out in a cold sweat, nausea, or lightheadedness. Don't wait more than five minutes to call [address]! Fast action can save your life. Calling 911 is almost always the fastest way to get lifesaving treatment. Emergency Medical Services staff can begin treatment when they arrive  up to an hour sooner than if someone gets to the hospital by car. AT THE COMPLETION OF DISCHARGE INSTRUCTION REVIEW, WE VERIFY: 
The discharge information has been reviewed with the patient and caregiver. Questions have been asked and answered meeting patient and caregiver expectations. The patient and caregiver verbalized understanding. Your discharge nurse was Indy RUIZ, RN-BC Board Certified - Pain Management Other information found in your discharge envelope: 
[x]     PRESCRIPTIONS     [] Sent electronically to your pharmacy []     PHYSICAL THERAPY PRESCRIPTION 
[]     APPOINTMENT CARDS []     Regional Anesthesia Pamphlet for block or block with On-Q Catheter from Anesthesia  Service []     Medical device information sheets/pamphlets from their   
[]     School/work excuse note. []     /parent work excuse note. The following personal items collected during your admission for safe keeping are returned to you:  
 
Dental Appliance: Dental Appliances: None Vi yuliana: Visual Aid: Glasses Hearing Aid:   
Jewelry:   
Clothing: Clothing:  (denies valuables. clothing to locker) Other Valuables:   
Valuables sent to safe:   
 
 
  
  
  
Introducing [facility]! [facility] introduces [employer] patient portal. Now you can access parts of your medical record, email your doctor's office, and request medication refills online. 1. In your internet browser, go to [email or web address] 2. Click on the First Time User? Click Here link in the Sign In box. You will see the New Member Sign Up page. 3. Enter your [employer] Access Code exactly as it appears below. You will not need to use this code after youve completed the sign-up process. If you do not sign up before the expiration date, you must request a new code. · [employer] Access Code: [email or web address] Expires: 6/24/2018  8:35 PM 
 
4. Enter the last four digits of your Social Security Number (xxxx) and Date of Birth (mm/dd/yyyy) as indicated and click Submit. You will be taken to the next sign-up page. 5. Create a B[employer]t ID. This will be your [employer] login ID and cannot be changed, so think of one that is secure and easy to remember. 6. Create a [employer] password. You can change your password at any time. 7. Enter your Password Reset Question and Answer. This can be used at a later time if you forget your password. 8. Enter your e-mail address. You will receive e-mail notification when new information is available in [address] 9. Click Sign Up. You can now view and download portions of your medical record. 10. Click the Download Summary menu link to download a portable copy of your medical information. If you have questions, please visit the Frequently Asked Questions section of the [employer] website. Remember, [employer] is NOT to be used for urgent needs. For medical emergencies, dial 911. Now available from your iPhone and Android! Introducing Glenn Lowery As a [facility] patient, I wanted to make you aware of our electronic visit tool called Glenn Lowery. Jonny Hobbs 24/7 allows you to connect within minutes with a medical provider 24 hours a day, seven days a week via a mobile device or tablet or logging into a secure website from your computer. You can access Glenn Lowery from anywhere in the [place]. A virtual visit might be right for you when you have a simple condition and feel like you just dont want to get out of bed, or cant get away from work for an appointment, when your regular [facility] provider is not available (evenings, weekends or holidays), or when youre out of town and need minor care. Electronic visits cost only $49 and if the [facility] 24/7 provider determines a prescription is needed to treat your condition, one can be electronically transmitted to a nearby pharmacy*. Please take a moment to enroll today if you have not already done so. The enrollment process is free and takes just a few minutes. To enroll, please download the [employer] 24/Plynked narinder to your tablet or phone, or visit [email or web address] to enroll on your computer. And, as an [address] [place] [address] patient with a [employer] account, the results of your visits will be scanned into your electronic medical record and your primary care provider will be able to view the scanned results. We urge you to continue to see your regular [facility] provider for your ongoing medical care. And while your primary care provider may not be the one available when you seek a Glenn [employer] virtual visit, the peace of mind you get from getting a real diagnosis real time can be priceless. For more information on Glenn [employer]of[employer], view our Frequently Asked Questions (FAQs) at [email or web address]. Sincerely, 
 
Vero Lenz MD 
Chief Medical Officer Dakotah Lombardo *:  certain medications cannot be prescribed via [employer] Providers Seen During Your Hospitalization Provider Specialty Primary office phone Sanjay Ramirez, [address] [facility] Orthopedic Surgery [phone] Your Primary Care Physician (PCP) Primary Care Physician Office Phone Office Fax Malena Gleason 46 [phone] You are allergic to the following Allergen Reactions Percocet (Oxycodone-Acetaminophen) Rash Recent Documentation Height Weight BMI Smoking Status 1.753 m 104.3 kg 33.97 kg/m2 Former Smoker Emergency Contacts Name Discharge Info Relation Home Work Mobile [id number] [facility] CAREGIVER [3] Spouse [3] [phone] Patient Belongings The following personal items are in your possession at time of discharge: 
  Dental Appliances: None  Visual Aid: Glasses             Clothing:  (denies valuables. clothing to locker) Please provide this summary of care documentation to your next provider. Signatures-by signing, you are acknowledging that this After Visit Summary has been reviewed with you and you have received a copy. Patient Signature:  ____________________________________________________________ Date:  ____________________________________________________________  
  
[facility] Provider Signature:  ____________________________________________________________ Date:  ____________________________________________________________

## 2018-04-11 NOTE — ANESTHESIA PROCEDURE NOTES
Peripheral Block    Start time: 4/11/2018 1:32 PM  End time: 4/11/2018 1:40 PM  Performed by: Yvette Hough  Authorized by: Yvette Hough       Pre-procedure:    Indications: at surgeon's request and primary anesthetic    Preanesthetic Checklist: patient identified, risks and benefits discussed, site marked, timeout performed, anesthesia consent given and patient being monitored    Timeout Time: 13:32          Block Type:   Block Type:  Supraclavicular (low interscalene/high supraclavicular)  Laterality:  Right  Monitoring:  Continuous pulse ox, frequent vital sign checks, heart rate, responsive to questions and oxygen  Injection Technique:  Single shot  Procedures: ultrasound guided and nerve stimulator    Patient Position: supine  Prep: chlorhexidine    Location:  Supraclavicular  Needle Type:  Stimuplex  Needle Gauge:  22 G  Needle Localization:  Anatomical landmarks and ultrasound guidance  Medication Injected:  0.5%  ropivacaine  Volume (mL):  30    Assessment:  Number of attempts:  1  Injection Assessment:  Incremental injection every 5 mL, local visualized surrounding nerve on ultrasound, negative aspiration for blood, no paresthesia and no intravascular symptoms  Patient tolerance:  Patient tolerated the procedure well with no immediate complications

## 2018-04-11 NOTE — BRIEF OP NOTE
BRIEF OPERATIVE NOTE    Date of Procedure: 4/11/2018   Preoperative Diagnosis: RIGHT DISTAL BICEPS TENDON RUPURE  Postoperative Diagnosis: RIGHT DISTAL BICEPS TENDON RUPURE    Procedure(s):  RIGHT DISTAL BICEPS REPAIR  (AX BLK)  Surgeon(s) and Role: Soren Monson MD - Primary         Surgical Assistant: none    Surgical Staff:  Circ-1: Vinicio Guerra RN  Circ-Relief: Chante Shearer  Physician Assistant: RANJAN Antonio  Scrub Tech-1: Chris Barker  Event Time In   Incision Start 1435   Incision Close 1541     Anesthesia: Other   Estimated Blood Loss: none  Specimens: * No specimens in log *   Findings:   Rupture of left distal bicep tendon with retraction   Complications: none  Implants:   Implant Name Type Inv.  Item Serial No.  Lot No. LRB No. Used Action   ANCHOR SUT W/TAPERED NDL 2.9MM --  - SN/A   ANCHOR SUT W/TAPERED NDL 2.9MM --  N/A [employer] [id number] Right 1 Implanted

## 2018-04-11 NOTE — PROGRESS NOTES
POST ANESTHESIA CARE    DISCHARGE / TRANSFER NOTE    Augie Barcenas was   discharged     via     wheel chair     to   private vehicle    . Patient was escorted by     nurse   . Patient verbalized   appreciation and was very pleased with care received  throughout their stay. Patient was discharged in     pleasant mood    . Pain at discharge/transfer was    0 /10. Discharge, medication and follow-up instructions were verbalized as understood prior to discharge  (if applicable for same-day procedures being discharged.)    All personal belongings have been returned to patient, and patient/family verbally confirm receiving belongings as all present.     Signed: Merlinda Showers BSN RN-BC

## 2018-04-11 NOTE — ANESTHESIA PREPROCEDURE EVALUATION
Anesthetic History   No history of anesthetic complications            Review of Systems / Medical History  Patient summary reviewed and pertinent labs reviewed    Pulmonary        Sleep apnea: CPAP           Neuro/Psych   Within defined limits           Cardiovascular  Within defined limits                Exercise tolerance: >4 METS     GI/Hepatic/Renal  Within defined limits              Endo/Other        Arthritis     Other Findings              Physical Exam    Airway  Mallampati: II  TM Distance: 4 - 6 cm  Neck ROM: normal range of motion   Mouth opening: Normal     Cardiovascular  Regular rate and rhythm,  S1 and S2 normal,  no murmur, click, rub, or gallop  Rhythm: regular  Rate: normal         Dental  No notable dental hx       Pulmonary  Breath sounds clear to auscultation               Abdominal  GI exam deferred       Other Findings            Anesthetic Plan    ASA: 2  Anesthesia type: regional - supraclavicular block      Post-op pain plan if not by surgeon: peripheral nerve block single      Anesthetic plan and risks discussed with: Patient      Risks and benefits explained to patient. I had a lengthy discussion with the patient about the risks associated with sedation in someone who has obstructive sleep apnea. If the patient is discharged to home post op, they know that they must have someone with them for the next 24 hours, should be very careful using narcotic pain medicine, and should use their CPAP with sleep or naps.

## 2018-04-11 NOTE — ANESTHESIA POSTPROCEDURE EVALUATION
Post-Anesthesia Evaluation and Assessment    Patient: Kody Leung MRN: [id number]  SSN: [id number]    YOB: 1955  Age: 63 y.o. Sex: male       Cardiovascular Function/Vital Signs  Visit Vitals    /78 (BP 1 Location: Left arm, BP Patient Position: At rest)    Pulse (!) 9    Temp 36.7 °C (98.1 °F)    Resp 13    Ht 5' 9\" (1.753 m)    Wt 104.3 kg (230 lb)    SpO2 96%    BMI 33.97 kg/m2       Patient is status post regional anesthesia for Procedure(s):  RIGHT DISTAL BICEPS REPAIR  (AX BLK). Nausea/Vomiting: None    Postoperative hydration reviewed and adequate. Pain:  Pain Scale 1: Numeric (0 - 10) (04/11/18 1244)  Pain Intensity 1: 4 (R shoulder to elbow/constant/ache) (04/11/18 1244)   Managed    Neurological Status:   Neuro (WDL): Within Defined Limits (04/11/18 1249)   Block intact    Mental Status and Level of Consciousness: Alert and oriented     Pulmonary Status:   O2 Device: Room air (04/11/18 1244)   Adequate oxygenation and airway patent    Complications related to anesthesia: None    Post-anesthesia assessment completed.  No concerns    Signed By: Tomasz Angel DO     April 11, 2018

## 2018-04-12 NOTE — OP NOTES
[facility]  OPERATIVE REPORT    [place]  MR#: [id number]  : 1955  ACCOUNT #: [de-identified]   DATE OF SERVICE: 2018    PREOPERATIVE DIAGNOSIS:  Right distal biceps tendon rupture. POSTOPERATIVE DIAGNOSIS:  Right distal biceps tendon rupture. PROCEDURE PERFORMED:  Right distal biceps tendon repair. SURGEON:  LAINE Liu MD     ASSISTANT:   Shireen Street    ANESTHESIA:  Axillary block. COMPLICATIONS:  None. ESTIMATED BLOOD LOSS:  0.    SPECIMENS REMOVED:  None    TOURNIQUET TIME:  One hour. INDICATIONS:  The patient is a 68-year-old male with a history of pain and discomfort about the right forearm. He was diagnosed as having a distal biceps tendon rupture following traumatic injury. He was counseled regarding surgical and nonsurgical treatment options and we elected to proceed with the surgical procedure as above. The risks and benefits were discussed in detail. DESCRIPTION OF PROCEDURE:  The patient was taken to the operating room and placed supine on the operating table. Following the administration of axillary block anesthetic, the right arm was prepped and draped in sterile fashion with Hibiclens and alcohol. A tourniquet was applied to the right proximal arm. The arm was exsanguinated with an Esmarch bandage and tourniquet inflated to 250 mmHg. An incision was made in the volar radial forearm, just distal to the antecubital fossa and elbow flexion crease. Subcutaneous dissection was carried out. Hemostasis was achieved with bipolar cautery. Superficial radial nerve and medial antebrachial cutaneous nerve branches were identified and protected. Hemostasis was achieved with bipolar cautery and Hemoclips. Deep dissection along the mobile wad was performed, and the superficial radial nerve identified and protected. Deep dissection to the level of the bicipital tuberosity of the radius was performed.   The bicipital bursa was identified and debrided. Clear fluid was noted. No evidence of infection. The biceps tendon was noted to be completely avulsed off of the bicipital tuberosity. It was retracted proximal to the elbow flexion crease. It was retrieved from the subcutaneous tissue and deep soft tissue of the proximal arm. Tendon quality was extremely poor, with significant fraying and disruption of the tendinous fibers distally. The tendon and muscle were also noted to be slightly contracted. The tendon was pulled down to its insertion point. A 2.9 mm JuggerKnot suture anchor was then predrilled and inserted into the bicipital tuberosity of the radius, within the footprint of the bicipital insertion point. Sutures from this were then passed in a figure-of-eight fashion through the remaining biceps tendon, providing structural integrity to the distal tendon, after debridement back to healthy-appearing tissue was performed. With the elbow in a flexed position, the tendon was sewed into the bicipital tuberosity using these sutures. Excellent stability, with no significant gapping of the tendon was achieved, and full passive extension was achievable with the elbow. The wound was copiously irrigated with sterile saline. Hemostasis was achieved with bipolar cautery. The wound was repaired using 4-0 Vicryl interrupted inverted subQ and a running 5-0 Monocryl with Steri-Strips on the surface. A sterile bulky, long-arm splint and dressing was applied and the tourniquet let down. The patient awakened from anesthesia and discharged to the recovery room in stable condition. During the procedure, a physician assistant was vital to the outcome of the case, providing stability to the arm, retraction of crucial structures and assistance with wound closure. The assistant also participated in maintaining the reduction of the biceps tendon during its repair.     DISCHARGE PLAN:  The patient instructed to keep arm elevated, clean and dry at all times, to call with any questions or concerns, follow up in 10 days for suture removal and wound check. The patient will follow up in hand therapy for onset of flexion range of motion and splinting. He was given a prescription for Percocet for pain control.       MD ANABEL Sears / ANDREW  D: 04/12/2018 07:38     T: 04/12/2018 11:29  JOB #: [id number]

## 2019-02-21 ENCOUNTER — HOSPITAL ENCOUNTER (EMERGENCY)
Age: 64
Discharge: HOME | End: 2019-02-21
Attending: EMERGENCY MEDICINE
Payer: COMMERCIAL

## 2019-02-21 ENCOUNTER — APPOINTMENT (OUTPATIENT)
Dept: CT IMAGING | Age: 64
End: 2019-02-21
Attending: PHYSICIAN ASSISTANT
Payer: COMMERCIAL

## 2019-02-21 VITALS
HEIGHT: 69 IN | TEMPERATURE: 97.3 F | SYSTOLIC BLOOD PRESSURE: 121 MMHG | OXYGEN SATURATION: 96 % | BODY MASS INDEX: 34.07 KG/M2 | RESPIRATION RATE: 14 BRPM | WEIGHT: 230 LBS | DIASTOLIC BLOOD PRESSURE: 76 MMHG | HEART RATE: 84 BPM

## 2019-02-21 DIAGNOSIS — R10.9 LEFT FLANK PAIN: Primary | ICD-10-CM

## 2019-02-21 LAB
ANION GAP SERPL CALC-SCNC: 11 MMOL/L (ref 5–15)
APPEARANCE UR: CLEAR
BACTERIA URNS QL MICRO: NEGATIVE /HPF
BASOPHILS # BLD: 0 K/UL (ref 0–0.1)
BASOPHILS NFR BLD: 0 % (ref 0–1)
BILIRUB UR QL: NEGATIVE
BUN SERPL-MCNC: 16 MG/DL (ref 6–20)
BUN/CREAT SERPL: 20 (ref 12–20)
CALCIUM SERPL-MCNC: 9.1 MG/DL (ref 8.5–10.1)
CHLORIDE SERPL-SCNC: 103 MMOL/L (ref 97–108)
CO2 SERPL-SCNC: 26 MMOL/L (ref 21–32)
COLOR UR: NORMAL
COMMENT, HOLDF: NORMAL
CREAT SERPL-MCNC: 0.79 MG/DL (ref 0.7–1.3)
DIFFERENTIAL METHOD BLD: NORMAL
EOSINOPHIL # BLD: 0.2 K/UL (ref 0–0.4)
EOSINOPHIL NFR BLD: 2 % (ref 0–7)
EPITH CASTS URNS QL MICRO: NORMAL /LPF
ERYTHROCYTE [DISTWIDTH] IN BLOOD BY AUTOMATED COUNT: 12.7 % (ref 11.5–14.5)
GLUCOSE SERPL-MCNC: 110 MG/DL (ref 65–100)
GLUCOSE UR STRIP.AUTO-MCNC: NEGATIVE MG/DL
HCT VFR BLD AUTO: 44.1 % (ref 36.6–50.3)
HGB BLD-MCNC: 14.9 G/DL (ref 12.1–17)
HGB UR QL STRIP: NEGATIVE
HYALINE CASTS URNS QL MICRO: NORMAL /LPF (ref 0–5)
IMM GRANULOCYTES # BLD AUTO: 0 K/UL (ref 0–0.04)
IMM GRANULOCYTES NFR BLD AUTO: 0 % (ref 0–0.5)
KETONES UR QL STRIP.AUTO: NEGATIVE MG/DL
LEUKOCYTE ESTERASE UR QL STRIP.AUTO: NEGATIVE
LYMPHOCYTES # BLD: 1.7 K/UL (ref 0.8–3.5)
LYMPHOCYTES NFR BLD: 22 % (ref 12–49)
MCH RBC QN AUTO: 30 PG (ref 26–34)
MCHC RBC AUTO-ENTMCNC: 33.8 G/DL (ref 30–36.5)
MCV RBC AUTO: 88.9 FL (ref 80–99)
MONOCYTES # BLD: 0.6 K/UL (ref 0–1)
MONOCYTES NFR BLD: 8 % (ref 5–13)
NEUTS SEG # BLD: 5 K/UL (ref 1.8–8)
NEUTS SEG NFR BLD: 67 % (ref 32–75)
NITRITE UR QL STRIP.AUTO: NEGATIVE
NRBC # BLD: 0 K/UL (ref 0–0.01)
NRBC BLD-RTO: 0 PER 100 WBC
PH UR STRIP: 5 [PH] (ref 5–8)
PLATELET # BLD AUTO: 230 K/UL (ref 150–400)
PMV BLD AUTO: 9.2 FL (ref 8.9–12.9)
POTASSIUM SERPL-SCNC: 4.1 MMOL/L (ref 3.5–5.1)
PROT UR STRIP-MCNC: NEGATIVE MG/DL
RBC # BLD AUTO: 4.96 M/UL (ref 4.1–5.7)
RBC #/AREA URNS HPF: NORMAL /HPF (ref 0–5)
SAMPLES BEING HELD,HOLD: NORMAL
SODIUM SERPL-SCNC: 140 MMOL/L (ref 136–145)
SP GR UR REFRACTOMETRY: 1.02 (ref 1–1.03)
UR CULT HOLD, URHOLD: NORMAL
UROBILINOGEN UR QL STRIP.AUTO: 0.2 EU/DL (ref 0.2–1)
WBC # BLD AUTO: 7.5 K/UL (ref 4.1–11.1)
WBC URNS QL MICRO: NORMAL /HPF (ref 0–4)

## 2019-02-21 PROCEDURE — 74011250636 HC RX REV CODE- 250/636: Performed by: PHYSICIAN ASSISTANT

## 2019-02-21 PROCEDURE — 81001 URINALYSIS AUTO W/SCOPE: CPT

## 2019-02-21 PROCEDURE — 85025 COMPLETE CBC W/AUTO DIFF WBC: CPT

## 2019-02-21 PROCEDURE — 99283 EMERGENCY DEPT VISIT LOW MDM: CPT

## 2019-02-21 PROCEDURE — 36415 COLL VENOUS BLD VENIPUNCTURE: CPT

## 2019-02-21 PROCEDURE — 80048 BASIC METABOLIC PNL TOTAL CA: CPT

## 2019-02-21 PROCEDURE — 96374 THER/PROPH/DIAG INJ IV PUSH: CPT

## 2019-02-21 PROCEDURE — 74176 CT ABD & PELVIS W/O CONTRAST: CPT

## 2019-02-21 RX ORDER — DICLOFENAC SODIUM 50 MG/1
50 TABLET, DELAYED RELEASE ORAL
Qty: 20 TAB | Refills: 0 | Status: ON HOLD | OUTPATIENT
Start: 2019-02-21 | End: 2021-02-19

## 2019-02-21 RX ORDER — METFORMIN HYDROCHLORIDE 500 MG/1
500 TABLET ORAL 2 TIMES DAILY WITH MEALS
COMMUNITY
End: 2022-02-14

## 2019-02-21 RX ORDER — LIDOCAINE 40 MG/G
CREAM TOPICAL
Qty: 15 G | Refills: 0 | Status: ON HOLD | OUTPATIENT
Start: 2019-02-21 | End: 2021-02-19

## 2019-02-21 RX ORDER — KETOROLAC TROMETHAMINE 30 MG/ML
15 INJECTION, SOLUTION INTRAMUSCULAR; INTRAVENOUS
Status: COMPLETED | OUTPATIENT
Start: 2019-02-21 | End: 2019-02-21

## 2019-02-21 RX ADMIN — KETOROLAC TROMETHAMINE 15 MG: 30 INJECTION, SOLUTION INTRAMUSCULAR; INTRAVENOUS at 08:47

## 2019-02-21 NOTE — ED PROVIDER NOTES
65 y/o male with PMHx of DM, skin cancer, sleep apnea, arthritis and amputation of right little finger, presenting with complaint of flank pain. The patient states he began to notice left-sided flank pain yesterday evening around 5:30 when he was getting into his truck. He denies any falls, heavy lifting or other traumatic injuries. The pain is currently 6/10 but is made worse with lifting his left leg. He has tried tylenol with no relief. Denies fevers, chills, chest pain, abd pain, N/V, dysuria, increased frequency or urgency. The history is provided by the patient. Past Medical History:  
Diagnosis Date  Amputation finger   
 5th digit right hand  Arthritis  Cancer ([facility] [address] skin cancer - bilateral hand, bilateral forearms  Sleep apnea   
 use CPAP Past Surgical History:  
Procedure Laterality Date  HX AMPUTATION FINGER Right 1998  
 5th digit  HX CARPAL TUNNEL RELEASE Left  HX CARPAL TUNNEL RELEASE Right  HX ORTHOPAEDIC Back surgery ,   HX ORTHOPAEDIC  2011  
 right shoulder  HX ORTHOPAEDIC    
 bone grafts bilateral feet  HX WISDOM TEETH EXTRACTION No family history on file. Social History Socioeconomic History  Marital status:  Spouse name: Not on file  Number of children: Not on file  Years of education: Not on file  Highest education level: Not on file Social Needs  Financial resource strain: Not on file  Food insecurity - worry: Not on file  Food insecurity - inability: Not on file  Transportation needs - medical: Not on file  Transportation needs - non-medical: Not on file Occupational History  Not on file Tobacco Use  Smoking status: Former Smoker Packs/day: 2.00 Last attempt to quit: 1987 Years since quittin.9  Smokeless tobacco: Never Used Substance and Sexual Activity  Alcohol use: Yes   Alcohol/week: 3.6 oz  
 Types: 6 Cans of beer per week  Drug use: No  
 Sexual activity: Not on file Other Topics Concern  Not on file Social History Narrative  Not on file ALLERGIES: Percocet [oxycodone-acetaminophen] Review of Systems Constitutional: Negative for chills and fever. HENT: Negative for congestion. Respiratory: Negative for cough and shortness of breath. Cardiovascular: Negative for chest pain. Gastrointestinal: Negative for abdominal pain, nausea and vomiting. Genitourinary: Positive for flank pain (left-sided) and frequency (baseline, unchanged). Negative for dysuria, hematuria and urgency. Musculoskeletal: Negative for myalgias. Skin: Negative for rash. All other systems reviewed and are negative. Vitals:  
 02/21/19 0815 BP: 141/83 Pulse: 88 Resp: 16 Temp: 97.3 °F (36.3 °C) SpO2: 97% Weight: 104.3 kg (230 lb) Height: 5' 9\" (1.753 m) Physical Exam  
Constitutional: He is oriented to person, place, and time. He appears well-developed and well-nourished. No distress. HENT:  
Head: Normocephalic and atraumatic. Eyes: Conjunctivae and EOM are normal.  
Neck: Normal range of motion. Neck supple. Cardiovascular: Normal rate, regular rhythm and normal heart sounds. Pulmonary/Chest: Effort normal and breath sounds normal.  
Abdominal: Soft. He exhibits no distension. There is no tenderness. There is no rebound and no guarding. Left flank tender to palpation. Neurological: He is alert and oriented to person, place, and time. Skin: Skin is warm and dry. He is not diaphoretic. Nursing note and vitals reviewed. MDM Number of Diagnoses or Management Options Left flank pain:  
  
Amount and/or Complexity of Data Reviewed Clinical lab tests: ordered and reviewed Tests in the radiology section of CPT®: ordered and reviewed Discuss the patient with other providers: yes (Dr. Marcos Flores, ED attending) Patient Progress Patient progress: stable Procedures 67 y/o male with PMHx of DM, skin cancer, sleep apnea, arthritis and amputation of right little finger, presenting with complaint of flank pain. History and exam consistent with muscular strain or other musculoskeletal pain. UA is negative for infection. CBC and BMP are unremarkable. CT abd/pelvis w/o contrast reveal diverticulosis but no diverticulitis, no evidence of kidney stones or other acute abnormalities. Pain improved somewhat with toradol. Safe for discharge home with Rx for diclofenac and topical lidocaine cream, instructions for PCP follow up. Strict ED return precautions discussed and provided in writing at time of discharge. The patient and his wife verbalized understanding and agreement with this plan.

## 2019-02-21 NOTE — ED TRIAGE NOTES
Patient c/o left flank pain since last night, states movement makes it worse, denies injury, denies n/v/d and urinary symptoms.

## 2021-02-15 ENCOUNTER — TRANSCRIBE ORDER (OUTPATIENT)
Dept: REGISTRATION | Age: 66
End: 2021-02-15

## 2021-02-15 ENCOUNTER — HOSPITAL ENCOUNTER (OUTPATIENT)
Dept: LAB | Age: 66
Discharge: HOME | End: 2021-02-15
Payer: COMMERCIAL

## 2021-02-15 DIAGNOSIS — U07.1 COVID-19: ICD-10-CM

## 2021-02-15 DIAGNOSIS — U07.1 COVID-19: Primary | ICD-10-CM

## 2021-02-15 PROCEDURE — U0003 INFECTIOUS AGENT DETECTION BY NUCLEIC ACID (DNA OR RNA); SEVERE ACUTE RESPIRATORY SYNDROME CORONAVIRUS 2 (SARS-COV-2) (CORONAVIRUS DISEASE [COVID-19]), AMPLIFIED PROBE TECHNIQUE, MAKING USE OF HIGH THROUGHPUT TECHNOLOGIES AS DESCRIBED BY CMS-2020-01-R: HCPCS

## 2021-02-15 NOTE — PERIOP NOTES
[facility]  Endoscopy Preprocedure Instructions      1. On the day of your surgery, please report to registration located on the 2nd floor of the  the Main Hospital. yes    2. You must have a responsible adult to drive you to the hospital, stay at the hospital during your procedure and drive you home. If they leave your procedure will not be started (no exceptions). yes    3. Do not have anything to eat or drink (including water, gum, mints, coffee, and juice) after midnight. This does not apply to the medications you were instructed to take by your physician.yesIf you are currently taking Plavix, Coumadin, Aspirin, or other blood-thinning agents, contact your physician for special instructions. not applicable,    4. If you are having a procedure that requires bowel prep: We recommend that you have only clear liquids the day before your procedure and begin your bowel prep by 5:00 pm.  You may continue to drink clear liquids until midnight.  If for any reason you are not able to complete your prep please notify your physician immediately. yes    5. Have a list of all current medications, including vitamins, herbal supplements and any other over the counter medications. yes    6. If you wear glasses, contacts, dentures and/or hearing aids, they may be removed prior to procedure, please bring a case to store them in. yes    7. You should understand that if you do not follow these instructions your procedure may be cancelled.  If your physical condition changes (I.e. fever, cold or flu) please contact your doctor as soon as possible.    8. It is important that you be on time.  If for any reason you are unable to keep your appointment please call [phone] the day of or your physician’s office prior to your scheduled procedure

## 2021-02-16 LAB — SARS-COV-2, COV2NT: NOT DETECTED

## 2021-02-19 ENCOUNTER — ANESTHESIA (OUTPATIENT)
Dept: ENDOSCOPY | Age: 66
End: 2021-02-19
Payer: COMMERCIAL

## 2021-02-19 ENCOUNTER — HOSPITAL ENCOUNTER (OUTPATIENT)
Age: 66
Setting detail: OUTPATIENT SURGERY
Discharge: HOME | End: 2021-02-19
Attending: SPECIALIST | Admitting: SPECIALIST
Payer: COMMERCIAL

## 2021-02-19 ENCOUNTER — ANESTHESIA EVENT (OUTPATIENT)
Dept: ENDOSCOPY | Age: 66
End: 2021-02-19
Payer: COMMERCIAL

## 2021-02-19 VITALS
WEIGHT: 227.07 LBS | HEIGHT: 69 IN | HEART RATE: 99 BPM | BODY MASS INDEX: 33.63 KG/M2 | SYSTOLIC BLOOD PRESSURE: 127 MMHG | TEMPERATURE: 97.9 F | RESPIRATION RATE: 16 BRPM | DIASTOLIC BLOOD PRESSURE: 77 MMHG | OXYGEN SATURATION: 98 %

## 2021-02-19 LAB
GLUCOSE BLD STRIP.AUTO-MCNC: 119 MG/DL (ref 65–100)
SERVICE CMNT-IMP: ABNORMAL

## 2021-02-19 PROCEDURE — 74011000250 HC RX REV CODE- 250: Performed by: NURSE ANESTHETIST, CERTIFIED REGISTERED

## 2021-02-19 PROCEDURE — 74011250636 HC RX REV CODE- 250/636: Performed by: NURSE ANESTHETIST, CERTIFIED REGISTERED

## 2021-02-19 PROCEDURE — 82962 GLUCOSE BLOOD TEST: CPT

## 2021-02-19 PROCEDURE — 74011250637 HC RX REV CODE- 250/637: Performed by: SPECIALIST

## 2021-02-19 PROCEDURE — 76060000031 HC ANESTHESIA FIRST 0.5 HR: Performed by: SPECIALIST

## 2021-02-19 PROCEDURE — 76040000019: Performed by: SPECIALIST

## 2021-02-19 PROCEDURE — 2709999900 HC NON-CHARGEABLE SUPPLY: Performed by: SPECIALIST

## 2021-02-19 PROCEDURE — 74011250636 HC RX REV CODE- 250/636: Performed by: SPECIALIST

## 2021-02-19 RX ORDER — NALOXONE HYDROCHLORIDE 0.4 MG/ML
0.4 INJECTION, SOLUTION INTRAMUSCULAR; INTRAVENOUS; SUBCUTANEOUS
Status: DISCONTINUED | OUTPATIENT
Start: 2021-02-19 | End: 2021-02-19 | Stop reason: HOSPADM

## 2021-02-19 RX ORDER — FLUMAZENIL 0.1 MG/ML
0.2 INJECTION INTRAVENOUS
Status: DISCONTINUED | OUTPATIENT
Start: 2021-02-19 | End: 2021-02-19 | Stop reason: HOSPADM

## 2021-02-19 RX ORDER — SODIUM CHLORIDE 9 MG/ML
50 INJECTION, SOLUTION INTRAVENOUS CONTINUOUS
Status: DISCONTINUED | OUTPATIENT
Start: 2021-02-19 | End: 2021-02-19 | Stop reason: HOSPADM

## 2021-02-19 RX ORDER — LIDOCAINE HYDROCHLORIDE 20 MG/ML
INJECTION, SOLUTION EPIDURAL; INFILTRATION; INTRACAUDAL; PERINEURAL AS NEEDED
Status: DISCONTINUED | OUTPATIENT
Start: 2021-02-19 | End: 2021-02-19 | Stop reason: HOSPADM

## 2021-02-19 RX ORDER — GLYCOPYRROLATE 0.2 MG/ML
INJECTION INTRAMUSCULAR; INTRAVENOUS AS NEEDED
Status: DISCONTINUED | OUTPATIENT
Start: 2021-02-19 | End: 2021-02-19 | Stop reason: HOSPADM

## 2021-02-19 RX ORDER — PROPOFOL 10 MG/ML
INJECTION, EMULSION INTRAVENOUS
Status: DISCONTINUED | OUTPATIENT
Start: 2021-02-19 | End: 2021-02-19 | Stop reason: HOSPADM

## 2021-02-19 RX ORDER — DEXTROMETHORPHAN/PSEUDOEPHED 2.5-7.5/.8
1.2 DROPS ORAL
Status: DISCONTINUED | OUTPATIENT
Start: 2021-02-19 | End: 2021-02-19 | Stop reason: HOSPADM

## 2021-02-19 RX ORDER — PROPOFOL 10 MG/ML
INJECTION, EMULSION INTRAVENOUS AS NEEDED
Status: DISCONTINUED | OUTPATIENT
Start: 2021-02-19 | End: 2021-02-19 | Stop reason: HOSPADM

## 2021-02-19 RX ORDER — MIDAZOLAM HYDROCHLORIDE 1 MG/ML
.25-5 INJECTION, SOLUTION INTRAMUSCULAR; INTRAVENOUS AS NEEDED
Status: DISCONTINUED | OUTPATIENT
Start: 2021-02-19 | End: 2021-02-19 | Stop reason: HOSPADM

## 2021-02-19 RX ORDER — FENTANYL CITRATE 50 UG/ML
25 INJECTION, SOLUTION INTRAMUSCULAR; INTRAVENOUS AS NEEDED
Status: DISCONTINUED | OUTPATIENT
Start: 2021-02-19 | End: 2021-02-19 | Stop reason: HOSPADM

## 2021-02-19 RX ORDER — SODIUM CHLORIDE 9 MG/ML
INJECTION, SOLUTION INTRAVENOUS
Status: DISCONTINUED | OUTPATIENT
Start: 2021-02-19 | End: 2021-02-19 | Stop reason: HOSPADM

## 2021-02-19 RX ADMIN — GLYCOPYRROLATE 0.2 MG: 0.2 INJECTION INTRAMUSCULAR; INTRAVENOUS at 08:50

## 2021-02-19 RX ADMIN — PROPOFOL 140 MCG/KG/MIN: 10 INJECTION, EMULSION INTRAVENOUS at 08:52

## 2021-02-19 RX ADMIN — LIDOCAINE HYDROCHLORIDE 80 MG: 20 INJECTION, SOLUTION INTRAVENOUS at 08:52

## 2021-02-19 RX ADMIN — PROPOFOL 30 MG: 10 INJECTION, EMULSION INTRAVENOUS at 08:58

## 2021-02-19 RX ADMIN — PROPOFOL 80 MG: 10 INJECTION, EMULSION INTRAVENOUS at 08:52

## 2021-02-19 RX ADMIN — PROPOFOL 20 MG: 10 INJECTION, EMULSION INTRAVENOUS at 08:54

## 2021-02-19 RX ADMIN — PROPOFOL 20 MG: 10 INJECTION, EMULSION INTRAVENOUS at 08:53

## 2021-02-19 RX ADMIN — PROPOFOL 30 MG: 10 INJECTION, EMULSION INTRAVENOUS at 08:55

## 2021-02-19 RX ADMIN — SODIUM CHLORIDE: 9 INJECTION, SOLUTION INTRAVENOUS at 08:10

## 2021-02-19 NOTE — PERIOP NOTES
9566  Anesthesia staff at patient's bedside administering anesthesia and monitoring patients vital signs throughout procedure. See anesthesia note. Post procedure, report received from Binta VASQUEZ.    Corina Esquivel was pre-cleaned at bedside immediately following procedure by endo Bernadette ahuja.    0967  Patient tolerated procedure. Abdomen soft and patient arousable and voices no complaints. Patient transported to endoscopy recovery area. Report given to post procedure RNLieutenant Ritter.

## 2021-02-19 NOTE — H&P
63 y.o. male for open access colonoscopy for screening   Additional data for completion of the targeted pre-endoscopy H&P will be provided under 'H&P interval notes'. Please see that document which will be attached to this. Bebo Ramirez MD  Last colon 3379 tubular adenoma.

## 2021-02-19 NOTE — INTERVAL H&P NOTE
Pre-Endoscopy H&P Update Chief complaint/HPI/ROS:  The indication for the procedure, the patient's history and the patient's current medications are reviewed prior to the procedure and that data is reported on the H&P to which this document is attached. Any significant complaints with regard to organ systems will be noted, and if not mentioned then a review of systems is not contributory. Past Medical History:  
Diagnosis Date Amputation finger 1998  
 5th digit right hand Arthritis Cancer ([facility] [address] skin cancer - bilateral hand, bilateral forearms Diabetes ([facility] [address] Gout   
 a few times/year Sleep apnea   
 use CPAP Past Surgical History:  
Procedure Laterality Date HX AMPUTATION FINGER Right 1998  
 5th digit HX CARPAL TUNNEL RELEASE Left HX CARPAL TUNNEL RELEASE Right HX ORTHOPAEDIC Back surgery ,  HX ORTHOPAEDIC    
 right shoulder HX ORTHOPAEDIC    
 bone grafts bilateral feet HX OTHER SURGICAL  2019  
 biceps reattachment HX WISDOM TEETH EXTRACTION Social  
Social History Tobacco Use Smoking status: Former Smoker Packs/day: 2.00 Quit date: 1987 Years since quittin.9 Smokeless tobacco: Never Used Substance Use Topics Alcohol use: Yes Alcohol/week: 8.0 standard drinks Types: 6 Cans of beer, 2 Shots of liquor per week Family History Problem Relation Age of Onset Diabetes Mother Cancer Father   
     prostate Allergies Allergen Reactions Percocet [Oxycodone-Acetaminophen] Rash Prior to Admission Medications Prescriptions Last Dose Informant Patient Reported? Taking?  
acetaminophen (TYLENOL) 325 mg tablet 2021 at Unknown time  Yes Yes Sig: Take 650 mg by mouth every four (4) hours as needed for Pain.  
metFORMIN (GLUCOPHAGE) 500 mg tablet 2021 at Unknown time  Yes Yes Sig: Take 500 mg by mouth two (2) times daily (with meals). Facility-Administered Medications: None PHYSICAL EXAM:  The patient is examined immediately prior to the procedure. Visit Vitals /73 Pulse 96 Temp 98.2 °F (36.8 °C) Resp 20 Ht 5' 9\" (1.753 m) Wt 103 kg (227 lb 1.2 oz) SpO2 97% BMI 33.53 kg/m² Gen: Appears comfortable, no distress. Pulm: comfortable respirations with no abnormal audible breath sounds HEART: well perfused, no abnormal audible heart sounds GI: abdomen flat. PLAN:  Informed consent discussion held, patient afforded an opportunity to ask questions and all questions answered. After being advised of the risks, benefits, and alternatives, the patient requested that we proceed and indicated so on a written consent form. Will proceed with procedure as planned.  
Michele Wright MD

## 2021-02-19 NOTE — PROCEDURES
[address] [facility] MIGUEL Bourgeois MD  [phone]      2021    Colonoscopy Procedure Note  Amarjit Al  :  1955  [place] Medical Record Number: [id number]    Indications:     Screening colonoscopy  PCP:  Rosalio Calix MD  Anesthesia/Sedation: Conscious Sedation/Moderate Sedation/GETA, see notes  Endoscopist:  Dr. Agnieszka Romero  Complications:  None  Estimated Blood Loss:  None    Permit:  The indications, risks, benefits and alternatives were reviewed with the patient or their decision maker who was provided an opportunity to ask questions and all questions were answered. The specific risks of colonoscopy with conscious sedation were reviewed, including but not limited to anesthetic complication, bleeding, adverse drug reaction, missed lesion, infection, IV site reactions, and intestinal perforation which would lead to the need for surgical repair. Alternatives to colonoscopy including radiographic imaging, observation without testing, or laboratory testing were reviewed including the limitations of those alternatives. After considering the options and having all their questions answered, the patient or their decision maker provided both verbal and written consent to proceed. Procedure in Detail:  After obtaining informed consent, positioning of the patient in the left lateral decubitus position, and conduction of a pre-procedure pause or \"time out\" the endoscope was introduced into the anus and advanced to the cecum, which was identified by the ileocecal valve and appendiceal orifice. The quality of the colonic preparation was good. A careful inspection was made as the colonoscope was withdrawn, findings and interventions are described below. Findings:    There is diverticulosis in the sigmoid colon without complications such as bleeding, inflammatory change, or luminal narrowing. In the rectum, medium internal hemorrhoids are noted without bleeding. Specimens:    none    Complications:   None; patient tolerated the procedure well. Impression:  Otherwise normal colonoscopy to the cecum    Recommendations:      - Colonoscopy 10 years. Thank you for entrusting me with this patient's care. Please do not hesitate to contact me with any questions or if I can be of assistance with any of your other patients' GI needs. Signed By: Tracy Sweeney MD                        February 19, 2021      Surgical assistant none. Implants none unless specified.

## 2021-02-19 NOTE — ROUTINE PROCESS
[facility] 1955 
[id number] Situation: 
Verbal report received from: Rena Moctezuma Procedure: Procedure(s): 
COLONOSCOPY Background: 
 
Preoperative diagnosis: PERSONAL HX OF COLON POLYPS Postoperative diagnosis: Diverticulosis, hemorrhoids. :  Dr. Patt Warren Assistant(s): Endoscopy Technician-1: Avila Ordonez Endoscopy RN-1: Shirin Lopez RN Specimens: * No specimens in log * H. Pylori  no Assessment: 
Intra-procedure medications Anesthesia gave intra-procedure sedation and medications, see anesthesia flow sheet yes Intravenous fluids: NS@ [employer] Vital signs stable Abdominal assessment: round and soft Recommendation: 
Discharge patient per MD order. Family-wife Permission to share finding with family or friend yes Endoscopy discharge instructions have been reviewed and given to patient. The patient verbalized understanding and acceptance of instructions. Dr. Patt Warren discussed with spouse procedure findings and next steps.

## 2021-02-19 NOTE — ANESTHESIA PREPROCEDURE EVALUATION
Anesthetic History   No history of anesthetic complications            Review of Systems / Medical History  Patient summary reviewed and pertinent labs reviewed    Pulmonary        Sleep apnea: CPAP           Neuro/Psych   Within defined limits           Cardiovascular  Within defined limits              Pertinent negatives: No hypertension  Exercise tolerance: >4 METS     GI/Hepatic/Renal  Within defined limits           Pertinent negatives: No GERD   Endo/Other    Diabetes: well controlled, type 2    Arthritis     Other Findings              Physical Exam    Airway  Mallampati: II  TM Distance: 4 - 6 cm  Neck ROM: normal range of motion   Mouth opening: Normal     Cardiovascular  Regular rate and rhythm,  S1 and S2 normal,  no murmur, click, rub, or gallop  Rhythm: regular  Rate: normal         Dental  No notable dental hx       Pulmonary  Breath sounds clear to auscultation               Abdominal  GI exam deferred       Other Findings            Anesthetic Plan    ASA: 2  Anesthesia type: MAC            Anesthetic plan and risks discussed with: Patient

## 2021-02-19 NOTE — DISCHARGE INSTRUCTIONS
[address] [facility] MIGUEL Priest MD  [phone]      February 19, 2021    Rosey Atkinson  YOB: 1955    COLONOSCOPY DISCHARGE INSTRUCTIONS    If there is redness at IV site you should apply warm compress to area. If redness or soreness persist contact Dr. Luba Priest' or your primary care doctor. There may be a slight amount of blood passed from the rectum. Gaseous discomfort may develop, but walking, belching will help relieve this. You may not operate a vehicle for 12 hours  You may not operate machinery or dangerous appliances for rest of today  You may not drink alcoholic beverages for 12 hours  Avoid making any critical decisions for 24 hours    DIET:  You may resume your normal diet, but some patients find that heavy or large meals may lead to indigestion or vomiting. I suggest a light meal as first food intake. MEDICATIONS:  The use of some over-the-counter pain medication may lead to bleeding after colon biopsies or polyp removal.  Tylenol (also called acetaminophen) is safe to take even if you have had colonoscopy with polyp removal.  Based on the procedure you had today you may safely take aspirin or aspirin-like products for the next ten (10) days. Remember that Tylenol (also called acetaminophen) is safe to take after colonoscopy even if you have had biopsies or polyps removed. ACTIVITY:  You may resume your normal household activities, but it is recommended that you spend the remainder of the day resting -  avoid any strenuous activity. CALL DR. Ravindra Monge' OFFICE IF:  Increasing pain, nausea, vomiting  Abdominal distension (swelling)  Significant new or increased bleeding (oral or rectal)  Fever/Chills  Chest pain/shortness of breath                       Additional instructions:   Great news, no polyps and no colon cancer. Colonoscopy again in 10 years. It was an honor to be your doctor today. Please let me or my office staff know if you have any feedback about today's procedure. Mark Yeh MD    Colonoscopy saves lives, and can prevent colon cancer. Everyone aged 48 or older needs colonoscopy.   Tell your family and friends: get the test!

## 2021-02-19 NOTE — ANESTHESIA POSTPROCEDURE EVALUATION
Procedure(s):  COLONOSCOPY. MAC    Anesthesia Post Evaluation      Multimodal analgesia: multimodal analgesia used between 6 hours prior to anesthesia start to PACU discharge  Patient location during evaluation: PACU  Patient participation: complete - patient participated  Level of consciousness: awake and alert  Pain management: adequate  Airway patency: patent  Anesthetic complications: no  Cardiovascular status: acceptable  Respiratory status: acceptable  Hydration status: acceptable  Post anesthesia nausea and vomiting:  none      INITIAL Post-op Vital signs:   Vitals Value Taken Time   BP     Temp     Pulse 101 02/19/21 0911   Resp 27 02/19/21 0911   SpO2 97 % 02/19/21 0911   Vitals shown include unvalidated device data.

## 2021-08-04 NOTE — PERIOP NOTES
PACU IN REPORT FROM ANESTHESIA    Verbal report received from   Lewis Bullock  following Other for Procedure(s) (LRB):  RIGHT DISTAL BICEPS REPAIR  (AX BLK) (Right) by  Mell Stewart MD.    Note the anesthesia record for medications given intraoperatively. Brief Initial Visual Assessment:    Patient Age: [] Infant(1-12mo)   [] Toddler(1-3yr)     [] (3-5yr)                     [] School-Age(5-13yr) [] Adolescent(13-18yr)  [x] LBACL(40-43AI)                         [] Geriatric Adult(>65yr). Patient    [x] Alert          [] Calm & Cooperative           [] Anxious  Appearance: [x] Drowsy  [] Sedated   [] Unresponsive     Oriented x 0          Airway:     [x] Patent                          [] Near-obstructed   [] Obstructed                                      [] Improved with head/airway repositioning                       Airway Adjuncts Present: [] Oral Airway   [] Nasal Trumpet         [] ETT     Respiratory  [x] Even      [] Labored      [] Shallow  Pattern:    [x] Non-Labored  [] VENT and/or respiratory assistance being provided. Skin:     [x] Pink [] Pale       [x] Warm [] Cool [] Cold   [x] Dry [] Moist [] Diaphoretic     Membranes:  [x] Pink [] Pale       [x] Moist [] Dry [] Crusty     Pain:   [x] No Acute Discomfort. 0 /10 Scale [x] Verbal Numeric   [] Moderate Discomfort.      [] V.A.S. [] Acute Discomfort.                [] A.N.V.    [] Chronic-Issue Related Discomfort.   [] F.L.A.C.C. Note E-MAR for medications administered. [] Salazar Barclay/Chapo    Note assessments documented in flowsheets; any assessment variants to be found in comments or narrative perioperative nurse notes.        Post-anesthesia care now assumed by [facility] BSN, RN-BC Admission Reconciliation is Not Complete  Discharge Reconciliation is Completed

## 2022-02-14 ENCOUNTER — OFFICE VISIT (OUTPATIENT)
Dept: INTERNAL MEDICINE CLINIC | Age: 67
End: 2022-02-14
Payer: COMMERCIAL

## 2022-02-14 VITALS
HEIGHT: 69 IN | WEIGHT: 224 LBS | HEART RATE: 84 BPM | BODY MASS INDEX: 33.18 KG/M2 | OXYGEN SATURATION: 98 % | RESPIRATION RATE: 14 BRPM | TEMPERATURE: 97.5 F | SYSTOLIC BLOOD PRESSURE: 120 MMHG | DIASTOLIC BLOOD PRESSURE: 70 MMHG

## 2022-02-14 DIAGNOSIS — E11.9 TYPE 2 DIABETES MELLITUS WITHOUT COMPLICATION, WITHOUT LONG-TERM CURRENT USE OF INSULIN (HCC): Primary | ICD-10-CM

## 2022-02-14 DIAGNOSIS — Z11.59 NEED FOR HEPATITIS C SCREENING TEST: ICD-10-CM

## 2022-02-14 DIAGNOSIS — M54.50 CHRONIC BILATERAL LOW BACK PAIN WITHOUT SCIATICA: ICD-10-CM

## 2022-02-14 DIAGNOSIS — H69.82 DYSFUNCTION OF LEFT EUSTACHIAN TUBE: ICD-10-CM

## 2022-02-14 DIAGNOSIS — N52.9 ERECTILE DYSFUNCTION, UNSPECIFIED ERECTILE DYSFUNCTION TYPE: ICD-10-CM

## 2022-02-14 DIAGNOSIS — G47.33 OBSTRUCTIVE SLEEP APNEA SYNDROME: ICD-10-CM

## 2022-02-14 DIAGNOSIS — G89.29 CHRONIC BILATERAL LOW BACK PAIN WITHOUT SCIATICA: ICD-10-CM

## 2022-02-14 DIAGNOSIS — Z85.828 HISTORY OF SKIN CANCER: ICD-10-CM

## 2022-02-14 DIAGNOSIS — Z87.891 HISTORY OF TOBACCO USE: ICD-10-CM

## 2022-02-14 DIAGNOSIS — M10.9 GOUT, UNSPECIFIED CAUSE, UNSPECIFIED CHRONICITY, UNSPECIFIED SITE: ICD-10-CM

## 2022-02-14 PROCEDURE — 99204 OFFICE O/P NEW MOD 45 MIN: CPT | Performed by: INTERNAL MEDICINE

## 2022-02-14 RX ORDER — METFORMIN HYDROCHLORIDE 500 MG/1
500 TABLET, EXTENDED RELEASE ORAL
Qty: 180 TABLET | Refills: 3 | Status: SHIPPED | OUTPATIENT
Start: 2022-02-14 | End: 2022-08-18 | Stop reason: SDUPTHER

## 2022-02-14 RX ORDER — SILDENAFIL 50 MG/1
50 TABLET, FILM COATED ORAL
Qty: 30 TABLET | Refills: 3 | Status: SHIPPED | OUTPATIENT
Start: 2022-02-14 | End: 2022-08-18 | Stop reason: SDUPTHER

## 2022-02-14 NOTE — PROGRESS NOTES
Assessment and Plan     1. Type 2 diabetes mellitus without complication, without long-term current use of insulin (HCC)  Assessment & Plan:  Metformin  BID - causes diarrhea  Recently had labs done back in December, he will try to get a copy of the labs  Recently saw an eye doctor, no issues  Switch to extended release Metformin due to diarrhea, same dose  Orders:  -     metFORMIN ER (GLUCOPHAGE XR) 500 mg tablet; Take 1 Tablet by mouth daily (with dinner). Indications: diabetes, Normal, Disp-180 Tablet, R-3Please cancel previous metformin rx  -     CBC W/O DIFF; Future  -     METABOLIC PANEL, COMPREHENSIVE; Future  -     HEMOGLOBIN A1C WITH EAG; Future  -     LIPID PANEL; Future  -     MICROALBUMIN, UR, RAND W/ MICROALB/CREAT RATIO; Future  2. Erectile dysfunction, unspecified erectile dysfunction type  Assessment & Plan:  Difficult obtaining erections   Does still have some early morning erections   Start Viagra as needed  Orders:  -     sildenafil citrate (VIAGRA) 50 mg tablet; Take 1 Tablet by mouth daily as needed for Erectile Dysfunction. , Normal, Disp-30 Tablet, R-3  3. Dysfunction of left eustachian tube  Assessment & Plan:  Retired from [employer]  Didn't wear hearing protection consistently  Feels like ear clogs up - intermittent, can last for an hour or so   +tinnitus   No wax on exam.  Likely related to eustachian tube dysfunction. Recommend trial of steroid nasal sprays  4. Chronic bilateral low back pain without sciatica  Assessment & Plan:  S/p surgeries  Neuropathy sx after   Uses tylenol daily   Monitor  5. Obstructive sleep apnea syndrome  Assessment & Plan:   monitored by specialist. No acute findings meriting change in the plan   +CPAP  Followed by PAR  6. Gout, unspecified cause, unspecified chronicity, unspecified site  Assessment & Plan:   Well controlled if he moderates etoh  Hasn't been an issue for a while   Monitor  7.  History of skin cancer  Assessment & Plan:   monitored by specialist. No acute findings meriting change in the plan   [place] [facility]  Back, arms  8. History of tobacco use  Assessment & Plan: Will need AAA screening at 74  9. Need for hepatitis C screening test  -     HEPATITIS C AB; Future       Benefits, risks, possible drug interactions, and side effects of all new medications were reviewed with the patient. Pt verbalized understanding. Return to clinic: 6 months for diabetes, AAA screening, ED, labs ordered to be done prior    An electronic signature was used to authenticate this note. Noreen Avina MD  [facility]  2/14/2022    Future Appointments   Date Time Provider Elana Boland   1/24/5712  9:93 AM Luiz Bernal MD [facility] BS AMB        History of Present Illness   Chief Complaint   Establish care    Alec Shafer is a 61 y.o. male         Review of Systems   Constitutional: Negative for chills and fever. HENT: Negative for hearing loss. Eyes: Negative for blurred vision. Respiratory: Negative for shortness of breath. Cardiovascular: Negative for chest pain. Gastrointestinal: Negative for abdominal pain, blood in stool, constipation, diarrhea, melena, nausea and vomiting. Genitourinary: Negative for dysuria and hematuria. Musculoskeletal: Positive for back pain. Negative for joint pain. Skin: Negative for rash. Neurological: Negative for headaches. Past Medical History     Allergies   Allergen Reactions    Percocet [Oxycodone-Acetaminophen] Hives        Current Outpatient Medications   Medication Sig    sildenafil citrate (VIAGRA) 50 mg tablet Take 1 Tablet by mouth daily as needed for Erectile Dysfunction.  metFORMIN ER (GLUCOPHAGE XR) 500 mg tablet Take 1 Tablet by mouth daily (with dinner). Indications: diabetes    acetaminophen (TYLENOL) 325 mg tablet Take 650 mg by mouth every four (4) hours as needed for Pain.      No current facility-administered medications for this visit. Patient Active Problem List   Diagnosis Code    Type 2 diabetes mellitus without complication, without long-term current use of insulin [facility] E11.9    Erectile dysfunction, unspecified erectile dysfunction type N52.9    Dysfunction of left eustachian tube H69.82    Chronic bilateral low back pain without sciatica M54.50, G89.29    Obstructive sleep apnea syndrome G47.33    Gout M10.9    History of skin cancer Z85.828    History of tobacco use Z87.891     Past Surgical History:   Procedure Laterality Date    COLONOSCOPY N/A 2021    COLONOSCOPY performed by Harley Stoll MD at [facility] Right 1998    5th digit    HX CARPAL TUNNEL RELEASE Bilateral     ,     HX ORTHOPAEDIC      lower Back surgery ,     HX ORTHOPAEDIC      right shoulder    HX ORTHOPAEDIC      bone grafts bilateral feet    HX ORTHOPAEDIC  2011    right rotator cuff repair    HX OTHER SURGICAL  2019    biceps reattachment    HX WISDOM TEETH EXTRACTION        Social History     Tobacco Use    Smoking status: Former Smoker     Packs/day: 2.00     Years: 20.00     Pack years: 40.00     Quit date: 1987     Years since quittin.8    Smokeless tobacco: Never Used   Substance Use Topics    Alcohol use: Yes     Alcohol/week: 8.0 standard drinks     Types: 6 Cans of beer, 2 Shots of liquor per week      Family History   Problem Relation Age of Onset    Diabetes Mother     Cancer Father         prostate    Heart Attack Paternal Grandfather [de-identified]    Stroke Paternal Grandfather     Diabetes Maternal Uncle     Hypertension Neg Hx     High Cholesterol Neg Hx         Physical Exam   Vitals:       Visit Vitals  /70   Pulse 84   Temp 97.5 °F (36.4 °C) (Temporal)   Resp 14   Ht 5' 9\" (1.753 m)   Wt 224 lb (101.6 kg)   SpO2 98%   BMI 33.08 kg/m²        Physical Exam  Constitutional:       General: He is not in acute distress.      Appearance: He is well-developed. HENT:      Right Ear: Tympanic membrane, ear canal and external ear normal.      Left Ear: Tympanic membrane, ear canal and external ear normal.   Eyes:      Extraocular Movements: Extraocular movements intact. Conjunctiva/sclera: Conjunctivae normal.   Cardiovascular:      Rate and Rhythm: Normal rate and regular rhythm. Pulses: Normal pulses. Heart sounds: No murmur heard. No friction rub. No gallop. Pulmonary:      Effort: No respiratory distress. Breath sounds: No wheezing, rhonchi or rales. Abdominal:      General: Bowel sounds are normal. There is no distension. Palpations: Abdomen is soft. There is no hepatomegaly, splenomegaly or mass. Tenderness: There is no abdominal tenderness. There is no guarding. Musculoskeletal:      Cervical back: Neck supple. Skin:     General: Skin is warm. Findings: No rash. Neurological:      Mental Status: He is alert.

## 2022-02-14 NOTE — PATIENT INSTRUCTIONS
For your ears - try nasonex, nasacort, or flonase  Switch metformin to a different version of metformin to see if that helps with the side effects

## 2022-02-14 NOTE — ASSESSMENT & PLAN NOTE
Difficult obtaining erections   Does still have some early morning erections   Start Viagra as needed

## 2022-02-14 NOTE — ASSESSMENT & PLAN NOTE
Metformin  BID - causes diarrhea  Recently had labs done back in December, he will try to get a copy of the labs  Recently saw an eye doctor, no issues  Switch to extended release Metformin due to diarrhea, same dose

## 2022-02-14 NOTE — ASSESSMENT & PLAN NOTE
Retired from [employer] wear hearing protection consistently  Feels like ear clogs up - intermittent, can last for an hour or so   +tinnitus   No wax on exam.  Likely related to eustachian tube dysfunction.   Recommend trial of steroid nasal sprays

## 2022-03-18 PROBLEM — H69.92 DYSFUNCTION OF LEFT EUSTACHIAN TUBE: Status: ACTIVE | Noted: 2022-02-14

## 2022-03-18 PROBLEM — H69.82 DYSFUNCTION OF LEFT EUSTACHIAN TUBE: Status: ACTIVE | Noted: 2022-02-14

## 2022-03-18 PROBLEM — N52.9 ERECTILE DYSFUNCTION, UNSPECIFIED ERECTILE DYSFUNCTION TYPE: Status: ACTIVE | Noted: 2022-02-14

## 2022-03-19 PROBLEM — M10.9 GOUT: Status: ACTIVE | Noted: 2022-02-14

## 2022-03-19 PROBLEM — Z85.828 HISTORY OF SKIN CANCER: Status: ACTIVE | Noted: 2022-02-14

## 2022-03-19 PROBLEM — Z87.891 HISTORY OF TOBACCO USE: Status: ACTIVE | Noted: 2022-02-14

## 2022-03-19 PROBLEM — E11.9 TYPE 2 DIABETES MELLITUS WITHOUT COMPLICATION, WITHOUT LONG-TERM CURRENT USE OF INSULIN (HCC): Status: ACTIVE | Noted: 2022-02-14

## 2022-03-19 PROBLEM — G47.33 OBSTRUCTIVE SLEEP APNEA SYNDROME: Status: ACTIVE | Noted: 2022-02-14

## 2022-03-19 PROBLEM — M54.50 CHRONIC BILATERAL LOW BACK PAIN WITHOUT SCIATICA: Status: ACTIVE | Noted: 2022-02-14

## 2022-03-19 PROBLEM — G89.29 CHRONIC BILATERAL LOW BACK PAIN WITHOUT SCIATICA: Status: ACTIVE | Noted: 2022-02-14

## 2022-08-08 ENCOUNTER — TELEPHONE (OUTPATIENT)
Dept: INTERNAL MEDICINE CLINIC | Age: 67
End: 2022-08-08

## 2022-08-08 NOTE — TELEPHONE ENCOUNTER
Nurse called patient to advise of no appointment needed for lab and labs are ordered. Patient thankful.

## 2022-08-08 NOTE — TELEPHONE ENCOUNTER
----- Message from Selma Zaman sent at 8/8/2022  8:43 AM EDT -----  Subject: Message to Provider    QUESTIONS  Information for Provider? Patient wants to schedule an appt for lab work   prior to his appt on 8/18. Please call to schedule   ---------------------------------------------------------------------------  --------------  CALL BACK INFO  [phone]; OK to leave message on voicemail  ---------------------------------------------------------------------------  --------------  SCRIPT ANSWERS  Relationship to Patient?  Self

## 2022-08-11 DIAGNOSIS — E11.9 TYPE 2 DIABETES MELLITUS WITHOUT COMPLICATION, WITHOUT LONG-TERM CURRENT USE OF INSULIN (HCC): ICD-10-CM

## 2022-08-11 DIAGNOSIS — Z11.59 NEED FOR HEPATITIS C SCREENING TEST: ICD-10-CM

## 2022-08-11 LAB
ALBUMIN SERPL-MCNC: 4.3 G/DL (ref 3.5–5)
ALBUMIN/GLOB SERPL: 1.3 {RATIO} (ref 1.1–2.2)
ALP SERPL-CCNC: 65 U/L (ref 45–117)
ALT SERPL-CCNC: 31 U/L (ref 12–78)
ANION GAP SERPL CALC-SCNC: 7 MMOL/L (ref 5–15)
AST SERPL-CCNC: 17 U/L (ref 15–37)
BILIRUB SERPL-MCNC: 0.6 MG/DL (ref 0.2–1)
BUN SERPL-MCNC: 22 MG/DL (ref 6–20)
BUN/CREAT SERPL: 24 (ref 12–20)
CALCIUM SERPL-MCNC: 9.5 MG/DL (ref 8.5–10.1)
CHLORIDE SERPL-SCNC: 105 MMOL/L (ref 97–108)
CHOLEST SERPL-MCNC: 164 MG/DL
CO2 SERPL-SCNC: 28 MMOL/L (ref 21–32)
CREAT SERPL-MCNC: 0.92 MG/DL (ref 0.7–1.3)
CREAT UR-MCNC: 29.5 MG/DL
ERYTHROCYTE [DISTWIDTH] IN BLOOD BY AUTOMATED COUNT: 13.2 % (ref 11.5–14.5)
EST. AVERAGE GLUCOSE BLD GHB EST-MCNC: 128 MG/DL
GLOBULIN SER CALC-MCNC: 3.2 G/DL (ref 2–4)
GLUCOSE SERPL-MCNC: 110 MG/DL (ref 65–100)
HBA1C MFR BLD: 6.1 % (ref 4–5.6)
HCT VFR BLD AUTO: 43.8 % (ref 36.6–50.3)
HCV AB SERPL QL IA: NONREACTIVE
HDLC SERPL-MCNC: 62 MG/DL
HDLC SERPL: 2.6 {RATIO} (ref 0–5)
HGB BLD-MCNC: 14.7 G/DL (ref 12.1–17)
LDLC SERPL CALC-MCNC: 91.6 MG/DL (ref 0–100)
MCH RBC QN AUTO: 31.2 PG (ref 26–34)
MCHC RBC AUTO-ENTMCNC: 33.6 G/DL (ref 30–36.5)
MCV RBC AUTO: 93 FL (ref 80–99)
MICROALBUMIN UR-MCNC: <0.5 MG/DL
MICROALBUMIN/CREAT UR-RTO: <17 MG/G (ref 0–30)
NRBC # BLD: 0 K/UL (ref 0–0.01)
NRBC BLD-RTO: 0 PER 100 WBC
PLATELET # BLD AUTO: 238 K/UL (ref 150–400)
PMV BLD AUTO: 9.9 FL (ref 8.9–12.9)
POTASSIUM SERPL-SCNC: 4.5 MMOL/L (ref 3.5–5.1)
PROT SERPL-MCNC: 7.5 G/DL (ref 6.4–8.2)
RBC # BLD AUTO: 4.71 M/UL (ref 4.1–5.7)
SODIUM SERPL-SCNC: 140 MMOL/L (ref 136–145)
TRIGL SERPL-MCNC: 52 MG/DL (ref ?–150)
VLDLC SERPL CALC-MCNC: 10.4 MG/DL
WBC # BLD AUTO: 5.4 K/UL (ref 4.1–11.1)

## 2022-08-15 NOTE — PROGRESS NOTES
Note   Chief Complaint   Diabetes    Daya Nettles is a 74 y.o. male     WBC normal.  CMP normal.  A1c 6.1. LDL 91. No microalbuminuria. Hep C negative  Reviewed labs with patient    1. Type 2 diabetes mellitus without complication, without long-term current use of insulin ([facility])  Assessment & Plan:  Still having diarrhea but not as much, does not bother him enough to want to stop medications  A1c 6.1, LDL 91, no microalbuminuria  Has seen an eye doctor in the past year  Well-controlled. Continue metformin  daily. LDL above goal.  Start rosuvastatin 10. Foot exam today. Counseled on good foot care  Orders:  -     rosuvastatin (CRESTOR) 10 mg tablet; Take 1 Tablet by mouth nightly. Indications: high cholesterol, Normal, Disp-90 Tablet, R-3  -     HEMOGLOBIN A1C WITH EAG; Future  -     LIPID PANEL; Future  -     metFORMIN ER (GLUCOPHAGE XR) 500 mg tablet; Take 1 Tablet by mouth daily (with dinner). Indications: diabetes, Normal, Disp-90 Tablet, R-3  2. Erectile dysfunction, unspecified erectile dysfunction type  Assessment & Plan:  Viagra prescription was too expensive  Discussed getting medication through [facility]  Orders:  -     sildenafil citrate (VIAGRA) 50 mg tablet; Take 1 Tablet by mouth daily as needed for Erectile Dysfunction. , Print, Disp-30 Tablet, R-3  3. Gout, unspecified cause, unspecified chronicity, unspecified site  Assessment & Plan:  Has a friend who is on allopurinol and patient is interested in starting  Has had 3 flares in the past year, mostly around his ankles  Discussed risk of flare while starting allopurinol. He will plan to start after his next trip. Start allopurinol 100 and check uric acid for goal less than 6  Orders:  -     URIC ACID; Future  -     indomethacin (INDOCIN) 50 mg capsule; Take 1 Capsule by mouth three (3) times daily as needed for Gout or Pain., Normal, Disp-42 Capsule, R-0  -     allopurinoL (ZYLOPRIM) 100 mg tablet; Take 1 Tablet by mouth daily. , Normal, Disp-90 Tablet, R-1  4. Multiple nevi  Assessment & Plan:  Recommend referral for skin check  5. Prostate cancer screening  -     PSA W/ REFLX FREE PSA; Future       Benefits, risks, possible drug interactions, and side effects of all new medications were reviewed with the patient. Pt verbalized understanding. Return to clinic: 6 months for diabetes, lab check, AAA screening    An electronic signature was used to authenticate this note. Martinez Cook MD  [facility]  8/19/2022    Future Appointments   Date Time Provider Elana Boland   6/31/8038  6:34 AM Jude Sanchez MD [facility] BS AMB        Objective   Vitals:       Visit Vitals  /83 (BP 1 Location: Left upper arm, BP Patient Position: Sitting, BP Cuff Size: Adult)   Pulse 77   Temp 98 °F (36.7 °C) (Oral)   Resp 14   Ht 5' 9\" (1.753 m)   Wt 227 lb (103 kg)   SpO2 98%   BMI 33.52 kg/m²        Physical Exam  Constitutional:       Appearance: Normal appearance. He is not ill-appearing. Cardiovascular:      Rate and Rhythm: Normal rate and regular rhythm. Pulses:           Dorsalis pedis pulses are 1+ on the right side and 1+ on the left side. Posterior tibial pulses are 1+ on the right side and 1+ on the left side. Heart sounds: No murmur heard. No friction rub. No gallop. Pulmonary:      Effort: No respiratory distress. Breath sounds: Normal breath sounds. No wheezing, rhonchi or rales. Musculoskeletal:      Right foot: Normal range of motion. No deformity. Left foot: Normal range of motion. No deformity. Feet:      Right foot:      Protective Sensation: 4 sites tested. 4 sites sensed. Skin integrity: Skin integrity normal.      Toenail Condition: Right toenails are normal.      Left foot:      Protective Sensation: 4 sites tested. 4 sites sensed.       Skin integrity: Skin integrity normal.      Toenail Condition: Left toenails are normal.   Neurological: Mental Status: He is alert. Current Outpatient Medications   Medication Sig    rosuvastatin (CRESTOR) 10 mg tablet Take 1 Tablet by mouth nightly. Indications: high cholesterol    indomethacin (INDOCIN) 50 mg capsule Take 1 Capsule by mouth three (3) times daily as needed for Gout or Pain.    metFORMIN ER (GLUCOPHAGE XR) 500 mg tablet Take 1 Tablet by mouth daily (with dinner). Indications: diabetes    allopurinoL (ZYLOPRIM) 100 mg tablet Take 1 Tablet by mouth daily. sildenafil citrate (VIAGRA) 50 mg tablet Take 1 Tablet by mouth daily as needed for Erectile Dysfunction. acetaminophen (TYLENOL) 325 mg tablet Take 650 mg by mouth every four (4) hours as needed for Pain. No current facility-administered medications for this visit.

## 2022-08-18 ENCOUNTER — OFFICE VISIT (OUTPATIENT)
Dept: INTERNAL MEDICINE CLINIC | Age: 67
End: 2022-08-18
Payer: COMMERCIAL

## 2022-08-18 VITALS
HEART RATE: 77 BPM | WEIGHT: 227 LBS | HEIGHT: 69 IN | OXYGEN SATURATION: 98 % | DIASTOLIC BLOOD PRESSURE: 83 MMHG | SYSTOLIC BLOOD PRESSURE: 135 MMHG | RESPIRATION RATE: 14 BRPM | TEMPERATURE: 98 F | BODY MASS INDEX: 33.62 KG/M2

## 2022-08-18 DIAGNOSIS — D22.9 MULTIPLE NEVI: ICD-10-CM

## 2022-08-18 DIAGNOSIS — Z12.5 PROSTATE CANCER SCREENING: ICD-10-CM

## 2022-08-18 DIAGNOSIS — E11.9 TYPE 2 DIABETES MELLITUS WITHOUT COMPLICATION, WITHOUT LONG-TERM CURRENT USE OF INSULIN (HCC): Primary | ICD-10-CM

## 2022-08-18 DIAGNOSIS — N52.9 ERECTILE DYSFUNCTION, UNSPECIFIED ERECTILE DYSFUNCTION TYPE: ICD-10-CM

## 2022-08-18 DIAGNOSIS — M10.9 GOUT, UNSPECIFIED CAUSE, UNSPECIFIED CHRONICITY, UNSPECIFIED SITE: ICD-10-CM

## 2022-08-18 PROCEDURE — 99214 OFFICE O/P EST MOD 30 MIN: CPT | Performed by: INTERNAL MEDICINE

## 2022-08-18 RX ORDER — ROSUVASTATIN CALCIUM 10 MG/1
10 TABLET, COATED ORAL
Qty: 90 TABLET | Refills: 3 | Status: SHIPPED | OUTPATIENT
Start: 2022-08-18

## 2022-08-18 RX ORDER — ALLOPURINOL 100 MG/1
100 TABLET ORAL DAILY
Qty: 90 TABLET | Refills: 1 | Status: SHIPPED | OUTPATIENT
Start: 2022-08-18

## 2022-08-18 RX ORDER — INDOMETHACIN 50 MG/1
50 CAPSULE ORAL
Qty: 42 CAPSULE | Refills: 0 | Status: SHIPPED | OUTPATIENT
Start: 2022-08-18

## 2022-08-18 RX ORDER — METFORMIN HYDROCHLORIDE 500 MG/1
500 TABLET, EXTENDED RELEASE ORAL
Qty: 90 TABLET | Refills: 3 | Status: SHIPPED | OUTPATIENT
Start: 2022-08-18

## 2022-08-18 RX ORDER — SILDENAFIL 50 MG/1
50 TABLET, FILM COATED ORAL
Qty: 30 TABLET | Refills: 3 | Status: SHIPPED | OUTPATIENT
Start: 2022-08-18

## 2022-08-19 PROBLEM — D22.9 MULTIPLE NEVI: Status: ACTIVE | Noted: 2022-08-19

## 2022-08-19 NOTE — ASSESSMENT & PLAN NOTE
Still having diarrhea but not as much, does not bother him enough to want to stop medications  A1c 6.1, LDL 91, no microalbuminuria  Has seen an eye doctor in the past year  Well-controlled. Continue metformin  daily. LDL above goal.  Start rosuvastatin 10. Foot exam today.   Counseled on good foot care

## 2022-08-19 NOTE — ASSESSMENT & PLAN NOTE
Has a friend who is on allopurinol and patient is interested in starting  Has had 3 flares in the past year, mostly around his ankles  Discussed risk of flare while starting allopurinol. He will plan to start after his next trip.   Start allopurinol 100 and check uric acid for goal less than 6

## 2022-12-01 DIAGNOSIS — E11.9 TYPE 2 DIABETES MELLITUS WITHOUT COMPLICATION, WITHOUT LONG-TERM CURRENT USE OF INSULIN (HCC): ICD-10-CM

## 2022-12-01 RX ORDER — METFORMIN HYDROCHLORIDE 500 MG/1
500 TABLET, EXTENDED RELEASE ORAL
Qty: 90 TABLET | Refills: 3 | Status: SHIPPED | OUTPATIENT
Start: 2022-12-01 | End: 2022-12-02 | Stop reason: SDUPTHER

## 2022-12-01 RX ORDER — ROSUVASTATIN CALCIUM 10 MG/1
10 TABLET, COATED ORAL
Qty: 90 TABLET | Refills: 3 | Status: SHIPPED | OUTPATIENT
Start: 2022-12-01 | End: 2022-12-02 | Stop reason: SDUPTHER

## 2022-12-01 NOTE — TELEPHONE ENCOUNTER
----- Message from Ragini Olea sent at 11/29/2022  3:16 PM EST -----  Subject: Refill Request    QUESTIONS  Name of Medication? metFORMIN ER (GLUCOPHAGE XR) 500 mg tablet  Patient-reported dosage and instructions? once a day  How many days do you have left? 14  Preferred Pharmacy? [address] phone number (if available)? [phone]  ---------------------------------------------------------------------------  --------------,  Name of Medication? rosuvastatin (CRESTOR) 10 mg tablet  Patient-reported dosage and instructions? One tablet a day  How many days do you have left? 7  Preferred Pharmacy? [address] phone number (if available)? [phone]  ---------------------------------------------------------------------------  --------------  Victory Gravely INFO  What is the best way for the office to contact you? OK to leave message on   voicemail  Preferred Call Back Phone Number? [phone]  ---------------------------------------------------------------------------  --------------  SCRIPT ANSWERS  Relationship to Patient?  Self

## 2022-12-02 DIAGNOSIS — E11.9 TYPE 2 DIABETES MELLITUS WITHOUT COMPLICATION, WITHOUT LONG-TERM CURRENT USE OF INSULIN (HCC): ICD-10-CM

## 2022-12-02 RX ORDER — METFORMIN HYDROCHLORIDE 500 MG/1
500 TABLET, EXTENDED RELEASE ORAL
Qty: 90 TABLET | Refills: 3 | Status: SHIPPED | OUTPATIENT
Start: 2022-12-02

## 2022-12-02 RX ORDER — ROSUVASTATIN CALCIUM 10 MG/1
10 TABLET, COATED ORAL
Qty: 90 TABLET | Refills: 3 | Status: SHIPPED | OUTPATIENT
Start: 2022-12-02

## 2023-01-30 DIAGNOSIS — M10.9 GOUT, UNSPECIFIED CAUSE, UNSPECIFIED CHRONICITY, UNSPECIFIED SITE: ICD-10-CM

## 2023-01-30 RX ORDER — ALLOPURINOL 200 MG/1
200 TABLET ORAL DAILY
Qty: 90 TABLET | Refills: 3 | Status: SHIPPED | OUTPATIENT
Start: 2023-01-30

## 2023-01-31 ENCOUNTER — TELEPHONE (OUTPATIENT)
Dept: INTERNAL MEDICINE CLINIC | Age: 68
End: 2023-01-31

## 2023-01-31 DIAGNOSIS — M10.9 GOUT, UNSPECIFIED CAUSE, UNSPECIFIED CHRONICITY, UNSPECIFIED SITE: ICD-10-CM

## 2023-01-31 DIAGNOSIS — M10.9 GOUT, UNSPECIFIED CAUSE, UNSPECIFIED CHRONICITY, UNSPECIFIED SITE: Primary | ICD-10-CM

## 2023-01-31 RX ORDER — INDOMETHACIN 50 MG/1
50 CAPSULE ORAL 3 TIMES DAILY
Qty: 42 CAPSULE | Refills: 0 | Status: SHIPPED | OUTPATIENT
Start: 2023-01-31 | End: 2023-01-31

## 2023-01-31 RX ORDER — COLCHICINE 0.6 MG/1
TABLET ORAL
Qty: 28 TABLET | Refills: 0 | Status: SHIPPED | OUTPATIENT
Start: 2023-01-31 | End: 2023-01-31

## 2023-01-31 RX ORDER — COLCHICINE 0.6 MG/1
TABLET ORAL
Qty: 28 TABLET | Refills: 0 | Status: SHIPPED | OUTPATIENT
Start: 2023-01-31

## 2023-01-31 RX ORDER — INDOMETHACIN 50 MG/1
50 CAPSULE ORAL 3 TIMES DAILY
Qty: 42 CAPSULE | Refills: 0 | Status: SHIPPED | OUTPATIENT
Start: 2023-01-31 | End: 2023-02-14

## 2023-02-16 ENCOUNTER — OFFICE VISIT (OUTPATIENT)
Dept: INTERNAL MEDICINE CLINIC | Age: 68
End: 2023-02-16
Payer: COMMERCIAL

## 2023-02-16 VITALS
RESPIRATION RATE: 14 BRPM | DIASTOLIC BLOOD PRESSURE: 75 MMHG | WEIGHT: 232 LBS | SYSTOLIC BLOOD PRESSURE: 120 MMHG | BODY MASS INDEX: 34.36 KG/M2 | HEIGHT: 69 IN | TEMPERATURE: 97.5 F | HEART RATE: 82 BPM | OXYGEN SATURATION: 97 %

## 2023-02-16 DIAGNOSIS — E11.9 TYPE 2 DIABETES MELLITUS WITHOUT COMPLICATION, WITHOUT LONG-TERM CURRENT USE OF INSULIN (HCC): ICD-10-CM

## 2023-02-16 DIAGNOSIS — E78.49 OTHER HYPERLIPIDEMIA: ICD-10-CM

## 2023-02-16 DIAGNOSIS — M10.9 GOUT, UNSPECIFIED CAUSE, UNSPECIFIED CHRONICITY, UNSPECIFIED SITE: Primary | ICD-10-CM

## 2023-02-16 DIAGNOSIS — Z23 ENCOUNTER FOR IMMUNIZATION: ICD-10-CM

## 2023-02-16 DIAGNOSIS — Z87.891 HISTORY OF TOBACCO USE: ICD-10-CM

## 2023-02-16 PROBLEM — Z00.00 ROUTINE ADULT HEALTH MAINTENANCE: Status: ACTIVE | Noted: 2023-02-16

## 2023-02-16 PROCEDURE — 90677 PCV20 VACCINE IM: CPT | Performed by: INTERNAL MEDICINE

## 2023-02-16 PROCEDURE — 99214 OFFICE O/P EST MOD 30 MIN: CPT | Performed by: INTERNAL MEDICINE

## 2023-02-16 PROCEDURE — 90471 IMMUNIZATION ADMIN: CPT | Performed by: INTERNAL MEDICINE

## 2023-02-16 RX ORDER — ALLOPURINOL 100 MG/1
TABLET ORAL DAILY
COMMUNITY

## 2023-02-16 RX ORDER — COLCHICINE 0.6 MG/1
0.6 TABLET ORAL DAILY
Qty: 90 TABLET | Refills: 1 | Status: SHIPPED | OUTPATIENT
Start: 2023-02-16

## 2023-02-16 NOTE — PROGRESS NOTES
Note   Chief Complaint   gout    Janell Espinosa is a 74 y.o. male     1. Gout, unspecified cause, unspecified chronicity, unspecified site  Assessment & Plan:   Discussed starting allopurinol after last visit. He did not start the medication until after his recent labs last month  Uric acid 6.8  Was already starting to have a gout flare when he started the medication and flare worsened after starting allopurinol-was treated with indomethacin and colchicine  Symptoms have since resolved  Recommend starting allopurinol 100 mg and colchicine 0.6 mg daily for prophylaxis. Plan to recheck uric acid for goal less than 6 in 3 months. Advised to stop colchicine if develops diarrhea and to let us know. Indomethacin caused GI symptoms  Orders:  -     colchicine 0.6 mg tablet; Take 1 Tablet by mouth daily. , Normal, Disp-90 Tablet, R-1  -     URIC ACID; Future  2. Type 2 diabetes mellitus without complication, without long-term current use of insulin ([facility]  Assessment & Plan:  Most recent A1c 6.4  Well-controlled. Continue metformin  daily  Recommend eye exam  Orders:  -     HEMOGLOBIN A1C WITH EAG; Future  3. History of tobacco use  Assessment & Plan:  AAA screening ordered  Orders:  -     US EXAM SCREENING AAA; Future  4. Encounter for immunization  -     PNEUMOCOCCAL, PCV20, PREVNAR 20, (AGE 18 YRS+), IM, PF  5. Other hyperlipidemia  Assessment & Plan:   Most recent LDL 31 (from 91) after starting rosuvastatin  At goal.  Continue rosuvastatin 10     Benefits, risks, possible drug interactions, and side effects of all new medications were reviewed with the patient. Pt verbalized understanding. Return to clinic:  3mo for DM2, gout  *    An electronic signature was used to authenticate this note.   Katelyn Sow MD  [facility]  2/16/2023    Future Appointments   Date Time Provider Elana Boland   8/77/1909  8:97 AM Priscila Jackson MD [facility] BS AMB        Objective Vitals:       Visit Vitals  /75   Pulse 82   Temp 97.5 °F (36.4 °C) (Oral)   Resp 14   Ht 5' 9\" (1.753 m)   Wt 232 lb (105.2 kg)   SpO2 97%   BMI 34.26 kg/m²        Physical Exam  Constitutional:       Appearance: Normal appearance. He is not ill-appearing. Pulmonary:      Effort: No respiratory distress. Neurological:      Mental Status: He is alert. Current Outpatient Medications   Medication Sig    allopurinoL (ZYLOPRIM) 100 mg tablet Take  by mouth daily. colchicine 0.6 mg tablet Take 1 Tablet by mouth daily. metFORMIN ER (GLUCOPHAGE XR) 500 mg tablet Take 1 Tablet by mouth daily (with dinner). Indications: diabetes    rosuvastatin (CRESTOR) 10 mg tablet Take 1 Tablet by mouth nightly. Indications: high cholesterol    indomethacin (INDOCIN) 50 mg capsule Take 1 Capsule by mouth three (3) times daily as needed for Gout or Pain. acetaminophen (TYLENOL) 325 mg tablet Take 650 mg by mouth every four (4) hours as needed for Pain.    sildenafil citrate (VIAGRA) 50 mg tablet Take 1 Tablet by mouth daily as needed for Erectile Dysfunction. (Patient not taking: Reported on 2/16/2023)     No current facility-administered medications for this visit.

## 2023-02-16 NOTE — ASSESSMENT & PLAN NOTE
Discussed starting allopurinol after last visit. He did not start the medication until after his recent labs last month  Uric acid 6.8  Was already starting to have a gout flare when he started the medication and flare worsened after starting allopurinol-was treated with indomethacin and colchicine  Symptoms have since resolved  Recommend starting allopurinol 100 mg and colchicine 0.6 mg daily for prophylaxis. Plan to recheck uric acid for goal less than 6 in 3 months. Advised to stop colchicine if develops diarrhea and to let us know.   Indomethacin caused GI symptoms

## 2023-02-16 NOTE — PATIENT INSTRUCTIONS
Start allopurinol 100mg and colchicine 0.6mg once daily.   If you develop diarrhea, stop the colchicine and call us

## 2023-02-24 ENCOUNTER — HOSPITAL ENCOUNTER (OUTPATIENT)
Dept: ULTRASOUND IMAGING | Age: 68
Discharge: HOME | End: 2023-02-24
Attending: INTERNAL MEDICINE
Payer: COMMERCIAL

## 2023-02-24 DIAGNOSIS — Z87.891 HISTORY OF TOBACCO USE: ICD-10-CM

## 2023-02-24 PROCEDURE — 76706 US ABDL AORTA SCREEN AAA: CPT

## 2023-03-18 PROBLEM — Z00.00 ROUTINE ADULT HEALTH MAINTENANCE: Status: RESOLVED | Noted: 2023-02-16 | Resolved: 2023-03-18

## 2023-05-09 LAB
EST. AVERAGE GLUCOSE BLD GHB EST-MCNC: 137 MG/DL
HBA1C MFR BLD: 6.4 % (ref 4–5.6)
URATE SERPL-MCNC: 6.8 MG/DL (ref 3.5–7.2)

## 2023-05-15 ENCOUNTER — OFFICE VISIT (OUTPATIENT)
Age: 68
End: 2023-05-15
Payer: MEDICARE

## 2023-05-15 VITALS
SYSTOLIC BLOOD PRESSURE: 136 MMHG | WEIGHT: 235 LBS | HEIGHT: 69 IN | BODY MASS INDEX: 34.8 KG/M2 | TEMPERATURE: 97.5 F | HEART RATE: 83 BPM | OXYGEN SATURATION: 98 % | DIASTOLIC BLOOD PRESSURE: 87 MMHG | RESPIRATION RATE: 18 BRPM

## 2023-05-15 DIAGNOSIS — E11.9 TYPE 2 DIABETES MELLITUS WITHOUT COMPLICATION, WITHOUT LONG-TERM CURRENT USE OF INSULIN (HCC): ICD-10-CM

## 2023-05-15 DIAGNOSIS — M1A.9XX0 CHRONIC GOUT WITHOUT TOPHUS, UNSPECIFIED CAUSE, UNSPECIFIED SITE: Primary | ICD-10-CM

## 2023-05-15 DIAGNOSIS — Z85.828 HISTORY OF SKIN CANCER: ICD-10-CM

## 2023-05-15 DIAGNOSIS — E78.49 OTHER HYPERLIPIDEMIA: ICD-10-CM

## 2023-05-15 PROBLEM — M10.9 GOUT: Status: ACTIVE | Noted: 2022-02-14

## 2023-05-15 PROCEDURE — 3044F HG A1C LEVEL LT 7.0%: CPT | Performed by: INTERNAL MEDICINE

## 2023-05-15 PROCEDURE — G8427 DOCREV CUR MEDS BY ELIG CLIN: HCPCS | Performed by: INTERNAL MEDICINE

## 2023-05-15 PROCEDURE — 1036F TOBACCO NON-USER: CPT | Performed by: INTERNAL MEDICINE

## 2023-05-15 PROCEDURE — 99214 OFFICE O/P EST MOD 30 MIN: CPT | Performed by: INTERNAL MEDICINE

## 2023-05-15 PROCEDURE — 2022F DILAT RTA XM EVC RTNOPTHY: CPT | Performed by: INTERNAL MEDICINE

## 2023-05-15 PROCEDURE — G8417 CALC BMI ABV UP PARAM F/U: HCPCS | Performed by: INTERNAL MEDICINE

## 2023-05-15 PROCEDURE — 3017F COLORECTAL CA SCREEN DOC REV: CPT | Performed by: INTERNAL MEDICINE

## 2023-05-15 PROCEDURE — 1123F ACP DISCUSS/DSCN MKR DOCD: CPT | Performed by: INTERNAL MEDICINE

## 2023-05-15 RX ORDER — METFORMIN HYDROCHLORIDE 500 MG/1
500 TABLET, EXTENDED RELEASE ORAL
Qty: 90 TABLET | Refills: 3 | Status: SHIPPED | OUTPATIENT
Start: 2023-05-15

## 2023-05-15 RX ORDER — ROSUVASTATIN CALCIUM 10 MG/1
10 TABLET, COATED ORAL DAILY
Qty: 90 TABLET | Refills: 3 | Status: SHIPPED | OUTPATIENT
Start: 2023-05-15

## 2023-05-15 RX ORDER — INDOMETHACIN 50 MG/1
50 CAPSULE ORAL 3 TIMES DAILY PRN
Qty: 30 CAPSULE | Refills: 1 | Status: SHIPPED | OUTPATIENT
Start: 2023-05-15

## 2023-05-15 RX ORDER — ALLOPURINOL 100 MG/1
200 TABLET ORAL DAILY
Qty: 180 TABLET | Refills: 3 | Status: SHIPPED | OUTPATIENT
Start: 2023-05-15

## 2023-05-15 NOTE — PROGRESS NOTES
I have reviewed all needed documentation in preparation for visit. Verified patient by name and date of birth  Chief Complaint   Patient presents with    Follow-up       Vitals:    05/15/23 0929   BP: 136/87   Site: Left Upper Arm   Position: Sitting   Cuff Size: Large Adult   Pulse: 83   Resp: 18   Temp: 97.5 °F (36.4 °C)   TempSrc: Oral   SpO2: 98%   Weight: 235 lb (106.6 kg)   Height: 5' 9\" (1.753 m)        Health Maintenance Due   Topic Date Due    COVID-19 Vaccine (1) Never done    HIV screen  Never done    Diabetic retinal exam  Never done    DTaP/Tdap/Td vaccine (1 - Tdap) Never done    Shingles vaccine (1 of 2) Never done    Annual Wellness Visit (AWV)  04/24/2023        1. \"Have you been to the ER, urgent care clinic since your last visit? Hospitalized since your last visit? \" no    2. \"Have you seen or consulted any other health care providers outside of the [address] [place] [address] since your last visit? \" no     3. For patients aged 39-70: Has the patient had a colonoscopy / FIT/ Cologuard? yes      If the patient is female:    4. For patients aged 41-77: Has the patient had a mammogram within the past 2 years? N/a      5. For patients aged 21-65: Has the patient had a pap smear?  N/a

## 2023-05-15 NOTE — ASSESSMENT & PLAN NOTE
Was getting diarrhea 2 months after starting colchicine so stopped   Uric acid 6.8 5/2023  Increase allopurinol to 200mg for goal <6 uric acid  Previous medications:  colchicine ppx (diarrhea), indomethacin (ok for short term but causes GI SE)

## 2023-05-15 NOTE — PROGRESS NOTES
Note   Chief Complaint   gout    Alejandra Steiner is a 74 y.o. male     1. Chronic gout without tophus, unspecified cause, unspecified site  Assessment & Plan:  Was getting diarrhea 2 months after starting colchicine so stopped   Uric acid 6.8 5/2023  Increase allopurinol to 200mg for goal <6 uric acid  Previous medications:  colchicine ppx (diarrhea), indomethacin (ok for short term but causes GI SE)  Orders:  -     allopurinol (ZYLOPRIM) 100 MG tablet; Take 2 tablets by mouth daily, Disp-180 tablet, R-3Normal  -     indomethacin (INDOCIN) 50 MG capsule; Take 1 capsule by mouth 3 times daily as needed for Pain, Disp-30 capsule, R-1Normal  2. Type 2 diabetes mellitus without complication, without long-term current use of insulin (HCC)  Assessment & Plan:   A1c 6.4  at goal, cont metformin 500 XR daily  Orders:  -     metFORMIN (GLUCOPHAGE-XR) 500 MG extended release tablet; Take 1 tablet by mouth Daily with supper, Disp-90 tablet, R-3Normal  3. Other hyperlipidemia  -     rosuvastatin (CRESTOR) 10 MG tablet; Take 1 tablet by mouth daily, Disp-90 tablet, R-3Normal  4. History of skin cancer  Assessment & Plan:  Skin cancer removed upper left arm  recently       Benefits, risks, possible drug interactions, and side effects of all new medications were reviewed with the patient. Pt verbalized understanding. Return to clinic:  as needed  *    An electronic signature was used to authenticate this note. Nba Akins MD  [facility]  5/15/2023    No future appointments. Objective   Vitals:       /87 (Site: Left Upper Arm, Position: Sitting, Cuff Size: Large Adult)   Pulse 83   Temp 97.5 °F (36.4 °C) (Oral)   Resp 18   Ht 5' 9\" (1.753 m)   Wt 235 lb (106.6 kg)   SpO2 98%   BMI 34.70 kg/m²      Physical Exam  Constitutional:       Appearance: Normal appearance. He is not ill-appearing. Cardiovascular:      Rate and Rhythm: Normal rate and regular rhythm.

## 2023-09-26 NOTE — RESULT ENCOUNTER NOTE
mychart message sent. A1c 6.4. Uric acid 6.8    Discussed increasing allopurinol at next visit  A1c stable.   Continue meds 26-Sep-2023 10:18

## 2023-11-28 DIAGNOSIS — E11.9 TYPE 2 DIABETES MELLITUS WITHOUT COMPLICATION, WITHOUT LONG-TERM CURRENT USE OF INSULIN (HCC): ICD-10-CM

## 2023-11-28 DIAGNOSIS — E78.49 OTHER HYPERLIPIDEMIA: ICD-10-CM

## 2023-11-28 RX ORDER — ROSUVASTATIN CALCIUM 10 MG/1
10 TABLET, COATED ORAL DAILY
Qty: 90 TABLET | Refills: 0 | Status: CANCELLED | OUTPATIENT
Start: 2023-11-28

## 2023-11-28 RX ORDER — METFORMIN HYDROCHLORIDE 500 MG/1
500 TABLET, EXTENDED RELEASE ORAL
Qty: 90 TABLET | Refills: 0 | Status: CANCELLED | OUTPATIENT
Start: 2023-11-28

## 2023-11-28 NOTE — TELEPHONE ENCOUNTER
Medication refill for     metFORMIN (GLUCOPHAGE-XR) 500 MG extended release tablet    rosuvastatin (CRESTOR) 10 MG tablet    [facility] - [facility] [address] [place] [address], [address] - P [phone] - F [phone]     Call Kamilla (Spouse)   [phone] (Home Phone)    They are waiting till January to seek care with the new PCP

## 2023-11-29 ENCOUNTER — TELEPHONE (OUTPATIENT)
Age: 68
End: 2023-11-29

## 2023-11-29 DIAGNOSIS — E11.9 TYPE 2 DIABETES MELLITUS WITHOUT COMPLICATION, WITHOUT LONG-TERM CURRENT USE OF INSULIN (HCC): ICD-10-CM

## 2023-11-29 DIAGNOSIS — E78.49 OTHER HYPERLIPIDEMIA: ICD-10-CM

## 2023-11-29 RX ORDER — ROSUVASTATIN CALCIUM 10 MG/1
10 TABLET, COATED ORAL DAILY
Qty: 60 TABLET | Refills: 0 | Status: SHIPPED | OUTPATIENT
Start: 2023-11-29

## 2023-11-29 RX ORDER — METFORMIN HYDROCHLORIDE 500 MG/1
500 TABLET, EXTENDED RELEASE ORAL
Qty: 60 TABLET | Refills: 0 | Status: SHIPPED | OUTPATIENT
Start: 2023-11-29

## 2023-11-29 NOTE — TELEPHONE ENCOUNTER
Nurse called and spoke with patient, patient HIPPA verified, nurse provided patient with detailed information regarding provider message with recommendations. Patient verbalized understanding and was thankful for the call.

## 2023-11-29 NOTE — TELEPHONE ENCOUNTER
Kamilla (Spouse) called to check up on the progress of her husbands prescriptions, I advised her that its still in progress. I told her she is more than welcome to check back later today. She said she also called [facility] and they hadn't received the refill request yet.    Kamilla  [phone] (Home Phone)

## 2023-12-31 DIAGNOSIS — E11.9 TYPE 2 DIABETES MELLITUS WITHOUT COMPLICATION, WITHOUT LONG-TERM CURRENT USE OF INSULIN (HCC): ICD-10-CM

## 2023-12-31 DIAGNOSIS — E78.49 OTHER HYPERLIPIDEMIA: ICD-10-CM

## 2024-01-01 RX ORDER — METFORMIN HYDROCHLORIDE 500 MG/1
TABLET, EXTENDED RELEASE ORAL
Qty: 60 TABLET | Refills: 0 | OUTPATIENT
Start: 2024-01-01

## 2024-01-01 RX ORDER — ROSUVASTATIN CALCIUM 10 MG/1
10 TABLET, COATED ORAL DAILY
Qty: 60 TABLET | Refills: 0 | OUTPATIENT
Start: 2024-01-01

## 2024-01-12 ENCOUNTER — TELEPHONE (OUTPATIENT)
Age: 69
End: 2024-01-12

## 2024-01-12 NOTE — TELEPHONE ENCOUNTER
PSR reached out to patient to schedule new patient appointment - we have set this up for 02/01/2024 at 11 AM

## 2024-01-12 NOTE — TELEPHONE ENCOUNTER
----- Message from Taty Ashby sent at 1/12/2024  8:19 AM EST -----  Subject: Appointment Request    Reason for Call: New Patient/New to Provider Appointment needed: Routine   Existing Condition Follow Up    QUESTIONS    Reason for appointment request? No appointments available during search     Additional Information for Provider? Pt is calling in to schedule routine   follow up/ new to provider appointment. Pt is previous pt of Maggie Brasher. Pt states he was advised to call in after the new year to   schedule with the new provider. Please advise.   ---------------------------------------------------------------------------  --------------  CALL BACK INFO  [phone]; OK to leave message on voicemail  ---------------------------------------------------------------------------  --------------  SCRIPT ANSWERS

## 2024-01-31 SDOH — HEALTH STABILITY: PHYSICAL HEALTH: ON AVERAGE, HOW MANY DAYS PER WEEK DO YOU ENGAGE IN MODERATE TO STRENUOUS EXERCISE (LIKE A BRISK WALK)?: 4 DAYS

## 2024-02-01 ENCOUNTER — OFFICE VISIT (OUTPATIENT)
Age: 69
End: 2024-02-01
Payer: MEDICARE

## 2024-02-01 VITALS
HEART RATE: 87 BPM | WEIGHT: 238.2 LBS | RESPIRATION RATE: 14 BRPM | TEMPERATURE: 97.8 F | DIASTOLIC BLOOD PRESSURE: 78 MMHG | HEIGHT: 69 IN | OXYGEN SATURATION: 97 % | SYSTOLIC BLOOD PRESSURE: 142 MMHG | BODY MASS INDEX: 35.28 KG/M2

## 2024-02-01 DIAGNOSIS — M1A.00X0 IDIOPATHIC CHRONIC GOUT WITHOUT TOPHUS, UNSPECIFIED SITE: ICD-10-CM

## 2024-02-01 DIAGNOSIS — E11.9 TYPE 2 DIABETES MELLITUS WITHOUT COMPLICATION, WITHOUT LONG-TERM CURRENT USE OF INSULIN (HCC): Primary | ICD-10-CM

## 2024-02-01 DIAGNOSIS — R03.0 ELEVATED BLOOD PRESSURE READING IN OFFICE WITHOUT DIAGNOSIS OF HYPERTENSION: ICD-10-CM

## 2024-02-01 DIAGNOSIS — E78.49 OTHER HYPERLIPIDEMIA: ICD-10-CM

## 2024-02-01 PROBLEM — H69.92 DYSFUNCTION OF LEFT EUSTACHIAN TUBE: Status: RESOLVED | Noted: 2022-02-14 | Resolved: 2024-02-01

## 2024-02-01 PROBLEM — M1A.09X0 CHRONIC GOUT OF MULTIPLE SITES: Status: ACTIVE | Noted: 2022-02-14

## 2024-02-01 PROBLEM — D22.9 MULTIPLE NEVI: Status: RESOLVED | Noted: 2022-08-19 | Resolved: 2024-02-01

## 2024-02-01 LAB
BASOPHILS # BLD: 0 K/UL (ref 0–0.1)
BASOPHILS NFR BLD: 0 % (ref 0–1)
CREAT UR-MCNC: 49.8 MG/DL
DIFFERENTIAL METHOD BLD: NORMAL
EOSINOPHIL # BLD: 0.3 K/UL (ref 0–0.4)
EOSINOPHIL NFR BLD: 4 % (ref 0–7)
ERYTHROCYTE [DISTWIDTH] IN BLOOD BY AUTOMATED COUNT: 12.5 % (ref 11.5–14.5)
HCT VFR BLD AUTO: 45.3 % (ref 36.6–50.3)
HGB BLD-MCNC: 15.7 G/DL (ref 12.1–17)
IMM GRANULOCYTES # BLD AUTO: 0 K/UL (ref 0–0.04)
IMM GRANULOCYTES NFR BLD AUTO: 0 % (ref 0–0.5)
LYMPHOCYTES # BLD: 2.2 K/UL (ref 0.8–3.5)
LYMPHOCYTES NFR BLD: 29 % (ref 12–49)
MCH RBC QN AUTO: 31.2 PG (ref 26–34)
MCHC RBC AUTO-ENTMCNC: 34.7 G/DL (ref 30–36.5)
MCV RBC AUTO: 90.1 FL (ref 80–99)
MICROALBUMIN UR-MCNC: <0.5 MG/DL
MICROALBUMIN/CREAT UR-RTO: <10 MG/G (ref 0–30)
MONOCYTES # BLD: 0.6 K/UL (ref 0–1)
MONOCYTES NFR BLD: 8 % (ref 5–13)
NEUTS SEG # BLD: 4.6 K/UL (ref 1.8–8)
NEUTS SEG NFR BLD: 59 % (ref 32–75)
NRBC # BLD: 0 K/UL (ref 0–0.01)
NRBC BLD-RTO: 0 PER 100 WBC
PLATELET # BLD AUTO: 259 K/UL (ref 150–400)
PMV BLD AUTO: 9.9 FL (ref 8.9–12.9)
RBC # BLD AUTO: 5.03 M/UL (ref 4.1–5.7)
WBC # BLD AUTO: 7.8 K/UL (ref 4.1–11.1)

## 2024-02-01 PROCEDURE — 3017F COLORECTAL CA SCREEN DOC REV: CPT | Performed by: INTERNAL MEDICINE

## 2024-02-01 PROCEDURE — 1123F ACP DISCUSS/DSCN MKR DOCD: CPT | Performed by: INTERNAL MEDICINE

## 2024-02-01 PROCEDURE — 1036F TOBACCO NON-USER: CPT | Performed by: INTERNAL MEDICINE

## 2024-02-01 PROCEDURE — 99214 OFFICE O/P EST MOD 30 MIN: CPT | Performed by: INTERNAL MEDICINE

## 2024-02-01 PROCEDURE — 2022F DILAT RTA XM EVC RTNOPTHY: CPT | Performed by: INTERNAL MEDICINE

## 2024-02-01 PROCEDURE — G8427 DOCREV CUR MEDS BY ELIG CLIN: HCPCS | Performed by: INTERNAL MEDICINE

## 2024-02-01 PROCEDURE — G8484 FLU IMMUNIZE NO ADMIN: HCPCS | Performed by: INTERNAL MEDICINE

## 2024-02-01 PROCEDURE — G8417 CALC BMI ABV UP PARAM F/U: HCPCS | Performed by: INTERNAL MEDICINE

## 2024-02-01 PROCEDURE — 3046F HEMOGLOBIN A1C LEVEL >9.0%: CPT | Performed by: INTERNAL MEDICINE

## 2024-02-01 RX ORDER — ROSUVASTATIN CALCIUM 10 MG/1
10 TABLET, COATED ORAL DAILY
Qty: 90 TABLET | Refills: 4 | Status: SHIPPED | OUTPATIENT
Start: 2024-02-01

## 2024-02-01 RX ORDER — METFORMIN HYDROCHLORIDE 500 MG/1
500 TABLET, EXTENDED RELEASE ORAL
Qty: 90 TABLET | Refills: 4 | Status: SHIPPED | OUTPATIENT
Start: 2024-02-01

## 2024-02-01 RX ORDER — ALLOPURINOL 100 MG/1
100 TABLET ORAL DAILY
Qty: 90 TABLET | Refills: 4 | Status: SHIPPED
Start: 2024-02-01

## 2024-02-01 SDOH — ECONOMIC STABILITY: FOOD INSECURITY: WITHIN THE PAST 12 MONTHS, YOU WORRIED THAT YOUR FOOD WOULD RUN OUT BEFORE YOU GOT MONEY TO BUY MORE.: NEVER TRUE

## 2024-02-01 SDOH — ECONOMIC STABILITY: INCOME INSECURITY: HOW HARD IS IT FOR YOU TO PAY FOR THE VERY BASICS LIKE FOOD, HOUSING, MEDICAL CARE, AND HEATING?: NOT HARD AT ALL

## 2024-02-01 SDOH — ECONOMIC STABILITY: HOUSING INSECURITY
IN THE LAST 12 MONTHS, WAS THERE A TIME WHEN YOU DID NOT HAVE A STEADY PLACE TO SLEEP OR SLEPT IN A SHELTER (INCLUDING NOW)?: NO

## 2024-02-01 SDOH — ECONOMIC STABILITY: FOOD INSECURITY: WITHIN THE PAST 12 MONTHS, THE FOOD YOU BOUGHT JUST DIDN'T LAST AND YOU DIDN'T HAVE MONEY TO GET MORE.: NEVER TRUE

## 2024-02-01 ASSESSMENT — PATIENT HEALTH QUESTIONNAIRE - PHQ9
SUM OF ALL RESPONSES TO PHQ QUESTIONS 1-9: 0
2. FEELING DOWN, DEPRESSED OR HOPELESS: 0
SUM OF ALL RESPONSES TO PHQ QUESTIONS 1-9: 0
1. LITTLE INTEREST OR PLEASURE IN DOING THINGS: 0
SUM OF ALL RESPONSES TO PHQ9 QUESTIONS 1 & 2: 0
SUM OF ALL RESPONSES TO PHQ QUESTIONS 1-9: 0
SUM OF ALL RESPONSES TO PHQ QUESTIONS 1-9: 0

## 2024-02-01 NOTE — PROGRESS NOTES
instead of the two recommended by Dr. Brasher. His last had a gout flare was a while ago--has them maybe once or twice a day. His uric acid was checked in May of 2023.    His blood pressure was good recently at sleep medicine.    He is not fasting for labs today.      Current Outpatient Medications   Medication Instructions    allopurinol (ZYLOPRIM) 200 mg, Oral, DAILY    metFORMIN (GLUCOPHAGE-XR) 500 mg, Oral, DAILY WITH DINNER    rosuvastatin (CRESTOR) 10 mg, Oral, DAILY          Allergies   Allergen Reactions    Oxycodone-Acetaminophen Hives        Objective     BP (!) 138/92 (Site: Left Upper Arm, Position: Sitting, Cuff Size: Large Adult)   Pulse 87   Temp 97.8 °F (36.6 °C) (Oral)   Resp 14   Ht 1.753 m (5' 9\")   Wt 108 kg (238 lb 3.2 oz)   SpO2 97%   BMI 35.18 kg/m²      Repeat blood pressure: 142/78    Physical Exam  Vitals reviewed.   Constitutional:       General: He is not in acute distress.     Appearance: He is not ill-appearing.   Cardiovascular:      Rate and Rhythm: Normal rate and regular rhythm.      Pulses: Normal pulses.      Heart sounds: No murmur heard.  Pulmonary:      Effort: Pulmonary effort is normal.      Breath sounds: Normal breath sounds. No wheezing or rales.   Musculoskeletal:      Right lower leg: No edema.      Left lower leg: No edema.   Neurological:      Mental Status: He is alert.                  An electronic signature was used to authenticate this note.    --Elizabeth Godinez MD

## 2024-02-02 LAB
ALBUMIN SERPL-MCNC: 4.4 G/DL (ref 3.5–5)
ALBUMIN/GLOB SERPL: 1.4 (ref 1.1–2.2)
ALP SERPL-CCNC: 71 U/L (ref 45–117)
ALT SERPL-CCNC: 35 U/L (ref 12–78)
ANION GAP SERPL CALC-SCNC: 9 MMOL/L (ref 5–15)
AST SERPL-CCNC: 18 U/L (ref 15–37)
BILIRUB SERPL-MCNC: 0.4 MG/DL (ref 0.2–1)
BUN SERPL-MCNC: 18 MG/DL (ref 6–20)
BUN/CREAT SERPL: 23 (ref 12–20)
CALCIUM SERPL-MCNC: 9.8 MG/DL (ref 8.5–10.1)
CHLORIDE SERPL-SCNC: 107 MMOL/L (ref 97–108)
CHOLEST SERPL-MCNC: 104 MG/DL
CO2 SERPL-SCNC: 24 MMOL/L (ref 21–32)
CREAT SERPL-MCNC: 0.8 MG/DL (ref 0.7–1.3)
GLOBULIN SER CALC-MCNC: 3.2 G/DL (ref 2–4)
GLUCOSE SERPL-MCNC: 109 MG/DL (ref 65–100)
HDLC SERPL-MCNC: 45 MG/DL
HDLC SERPL: 2.3 (ref 0–5)
LDLC SERPL CALC-MCNC: 31.2 MG/DL (ref 0–100)
POTASSIUM SERPL-SCNC: 4.1 MMOL/L (ref 3.5–5.1)
PROT SERPL-MCNC: 7.6 G/DL (ref 6.4–8.2)
SODIUM SERPL-SCNC: 140 MMOL/L (ref 136–145)
TRIGL SERPL-MCNC: 139 MG/DL
URATE SERPL-MCNC: 6.4 MG/DL (ref 3.5–7.2)
VLDLC SERPL CALC-MCNC: 27.8 MG/DL

## 2024-02-03 LAB
EST. AVERAGE GLUCOSE BLD GHB EST-MCNC: 143 MG/DL
HBA1C MFR BLD: 6.6 % (ref 4–5.6)

## 2024-04-29 SDOH — HEALTH STABILITY: PHYSICAL HEALTH: ON AVERAGE, HOW MANY DAYS PER WEEK DO YOU ENGAGE IN MODERATE TO STRENUOUS EXERCISE (LIKE A BRISK WALK)?: 2 DAYS

## 2024-04-29 SDOH — HEALTH STABILITY: PHYSICAL HEALTH: ON AVERAGE, HOW MANY MINUTES DO YOU ENGAGE IN EXERCISE AT THIS LEVEL?: 150+ MIN

## 2024-04-29 ASSESSMENT — PATIENT HEALTH QUESTIONNAIRE - PHQ9
SUM OF ALL RESPONSES TO PHQ QUESTIONS 1-9: 0
2. FEELING DOWN, DEPRESSED OR HOPELESS: NOT AT ALL
SUM OF ALL RESPONSES TO PHQ QUESTIONS 1-9: 0
SUM OF ALL RESPONSES TO PHQ9 QUESTIONS 1 & 2: 0
1. LITTLE INTEREST OR PLEASURE IN DOING THINGS: NOT AT ALL
SUM OF ALL RESPONSES TO PHQ QUESTIONS 1-9: 0
SUM OF ALL RESPONSES TO PHQ QUESTIONS 1-9: 0

## 2024-04-29 ASSESSMENT — LIFESTYLE VARIABLES
HOW MANY STANDARD DRINKS CONTAINING ALCOHOL DO YOU HAVE ON A TYPICAL DAY: 1
HOW OFTEN DO YOU HAVE A DRINK CONTAINING ALCOHOL: 2-3 TIMES A WEEK
HAS A RELATIVE, FRIEND, DOCTOR, OR ANOTHER HEALTH PROFESSIONAL EXPRESSED CONCERN ABOUT YOUR DRINKING OR SUGGESTED YOU CUT DOWN: NO
HOW OFTEN DURING THE LAST YEAR HAVE YOU BEEN UNABLE TO REMEMBER WHAT HAPPENED THE NIGHT BEFORE BECAUSE YOU HAD BEEN DRINKING: NEVER
HOW MANY STANDARD DRINKS CONTAINING ALCOHOL DO YOU HAVE ON A TYPICAL DAY: 1 OR 2
HOW OFTEN DURING THE LAST YEAR HAVE YOU HAD A FEELING OF GUILT OR REMORSE AFTER DRINKING: NEVER
HOW OFTEN DURING THE LAST YEAR HAVE YOU FOUND THAT YOU WERE NOT ABLE TO STOP DRINKING ONCE YOU HAD STARTED: NEVER
HOW OFTEN DO YOU HAVE A DRINK CONTAINING ALCOHOL: 4
HOW OFTEN DURING THE LAST YEAR HAVE YOU HAD A FEELING OF GUILT OR REMORSE AFTER DRINKING: NEVER
HOW OFTEN DURING THE LAST YEAR HAVE YOU NEEDED AN ALCOHOLIC DRINK FIRST THING IN THE MORNING TO GET YOURSELF GOING AFTER A NIGHT OF HEAVY DRINKING: NEVER
HOW OFTEN DO YOU HAVE SIX OR MORE DRINKS ON ONE OCCASION: 3
HOW OFTEN DURING THE LAST YEAR HAVE YOU FAILED TO DO WHAT WAS NORMALLY EXPECTED FROM YOU BECAUSE OF DRINKING: NEVER
HOW OFTEN DURING THE LAST YEAR HAVE YOU BEEN UNABLE TO REMEMBER WHAT HAPPENED THE NIGHT BEFORE BECAUSE YOU HAD BEEN DRINKING: NEVER
HAS A RELATIVE, FRIEND, DOCTOR, OR ANOTHER HEALTH PROFESSIONAL EXPRESSED CONCERN ABOUT YOUR DRINKING OR SUGGESTED YOU CUT DOWN: NO
HOW OFTEN DURING THE LAST YEAR HAVE YOU NEEDED AN ALCOHOLIC DRINK FIRST THING IN THE MORNING TO GET YOURSELF GOING AFTER A NIGHT OF HEAVY DRINKING: NEVER
HAVE YOU OR SOMEONE ELSE BEEN INJURED AS A RESULT OF YOUR DRINKING: NO
HAVE YOU OR SOMEONE ELSE BEEN INJURED AS A RESULT OF YOUR DRINKING: NO
HOW OFTEN DURING THE LAST YEAR HAVE YOU FOUND THAT YOU WERE NOT ABLE TO STOP DRINKING ONCE YOU HAD STARTED: NEVER
HOW OFTEN DURING THE LAST YEAR HAVE YOU FAILED TO DO WHAT WAS NORMALLY EXPECTED FROM YOU BECAUSE OF DRINKING: NEVER

## 2024-05-02 ENCOUNTER — OFFICE VISIT (OUTPATIENT)
Age: 69
End: 2024-05-02
Payer: MEDICARE

## 2024-05-02 VITALS
WEIGHT: 234 LBS | RESPIRATION RATE: 14 BRPM | SYSTOLIC BLOOD PRESSURE: 136 MMHG | BODY MASS INDEX: 34.66 KG/M2 | HEIGHT: 69 IN | DIASTOLIC BLOOD PRESSURE: 85 MMHG | HEART RATE: 79 BPM | OXYGEN SATURATION: 98 %

## 2024-05-02 DIAGNOSIS — Z00.00 MEDICARE ANNUAL WELLNESS VISIT, SUBSEQUENT: Primary | ICD-10-CM

## 2024-05-02 DIAGNOSIS — Z12.5 PROSTATE CANCER SCREENING: ICD-10-CM

## 2024-05-02 DIAGNOSIS — R03.0 ELEVATED BLOOD PRESSURE READING IN OFFICE WITHOUT DIAGNOSIS OF HYPERTENSION: ICD-10-CM

## 2024-05-02 DIAGNOSIS — E11.9 TYPE 2 DIABETES MELLITUS WITHOUT COMPLICATION, WITHOUT LONG-TERM CURRENT USE OF INSULIN (HCC): ICD-10-CM

## 2024-05-02 LAB — PSA SERPL-MCNC: 0.3 NG/ML (ref 0.01–4)

## 2024-05-02 PROCEDURE — 2022F DILAT RTA XM EVC RTNOPTHY: CPT | Performed by: INTERNAL MEDICINE

## 2024-05-02 PROCEDURE — 90715 TDAP VACCINE 7 YRS/> IM: CPT | Performed by: INTERNAL MEDICINE

## 2024-05-02 PROCEDURE — 99214 OFFICE O/P EST MOD 30 MIN: CPT | Performed by: INTERNAL MEDICINE

## 2024-05-02 PROCEDURE — G8427 DOCREV CUR MEDS BY ELIG CLIN: HCPCS | Performed by: INTERNAL MEDICINE

## 2024-05-02 PROCEDURE — 1123F ACP DISCUSS/DSCN MKR DOCD: CPT | Performed by: INTERNAL MEDICINE

## 2024-05-02 PROCEDURE — 3044F HG A1C LEVEL LT 7.0%: CPT | Performed by: INTERNAL MEDICINE

## 2024-05-02 PROCEDURE — G8417 CALC BMI ABV UP PARAM F/U: HCPCS | Performed by: INTERNAL MEDICINE

## 2024-05-02 PROCEDURE — 1036F TOBACCO NON-USER: CPT | Performed by: INTERNAL MEDICINE

## 2024-05-02 PROCEDURE — PBSHW TDAP, BOOSTRIX, (AGE 10 YRS+), IM: Performed by: INTERNAL MEDICINE

## 2024-05-02 PROCEDURE — G0439 PPPS, SUBSEQ VISIT: HCPCS | Performed by: INTERNAL MEDICINE

## 2024-05-02 PROCEDURE — 3017F COLORECTAL CA SCREEN DOC REV: CPT | Performed by: INTERNAL MEDICINE

## 2024-05-02 NOTE — PATIENT INSTRUCTIONS
adult-strength or 2 to 4 low-dose aspirin. Wait for an ambulance. Do not try to drive yourself.  Watch closely for changes in your health, and be sure to contact your doctor if you have any problems.  Where can you learn more?  Go to https://www.CareFlash.net/patientEd and enter F075 to learn more about \"A Healthy Heart: Care Instructions.\"  Current as of: June 24, 2023               Content Version: 14.0  © 1515-3011 [employer].   Care instructions adapted under license by [employer]. If you have questions about a medical condition or this instruction, always ask your healthcare professional. [employer] disclaims any warranty or liability for your use of this information.      Personalized Preventive Plan for Garfield Chambers - 5/2/2024  Medicare offers a range of preventive health benefits. Some of the tests and screenings are paid in full while other may be subject to a deductible, co-insurance, and/or copay.    Some of these benefits include a comprehensive review of your medical history including lifestyle, illnesses that may run in your family, and various assessments and screenings as appropriate.    After reviewing your medical record and screening and assessments performed today your provider may have ordered immunizations, labs, imaging, and/or referrals for you.  A list of these orders (if applicable) as well as your Preventive Care list are included within your After Visit Summary for your review.    Other Preventive Recommendations:    A preventive eye exam performed by an eye specialist is recommended every 1-2 years to screen for glaucoma; cataracts, macular degeneration, and other eye disorders.  A preventive dental visit is recommended every 6 months.  Try to get at least 150 minutes of exercise per week or 10,000 steps per day on a pedometer .  Order or download the FREE \"Exercise & Physical Activity: Your Everyday Guide\" from [employer]. Call

## 2024-05-02 NOTE — PROGRESS NOTES
Medicare Annual Wellness Visit    Garfield Chambers is here for Medicare AWV    Assessment & Plan     1. Medicare annual wellness visit, subsequent  We discussed the benefits of advanced care planning today. He will bring in a copy of his advanced medical directive. Most immunizations are up to date; Shingrix recommended and tetanus administered today. Colonoscopy is due again in 2031. Continue to follow up here every six months, at least, and with specialists as directed.     2. Prostate cancer screening  We discussed the benefits and limitations of prostate cancer screening. He has family history of prostate cancer in his father, so decided to have a PSA with labs.   - PSA Screening; Future    3. Type 2 diabetes mellitus without complication, without long-term current use of insulin (HCC)  Well-controlled, with A1c of 6.6% on last labs in February.  Continue metformin.  Dilated eye exam is up-to-date.    4. Elevated blood pressure reading in office without diagnosis of hypertension  Improved, blood pressure closer to ideal for diabetic patient on exam today.  Increase exercise, limited alcohol to 2 drinks per day recommended.         Recommendations for Preventive Services Due: see orders and patient instructions/AVS.  Recommended screening schedule for the next 5-10 years is provided to the patient in written form: see Patient Instructions/AVS.       Follow up: 5 months for DM, gout          Subjective         Garfield Chambers is here for a wellness visit.    Health maintenance: He has been seen by the following providers in the last year: dermatology, sleep apnea, optometry. Last colonoscopy was done in 2021. Dilated eye exam was about a month ago. Denies any significant decrease in hearing--he does have some issues hearing the TV but not with conversations.  Has had no falls in the last year. Ms. Chambers is independent with ADLs, and iADLs, and feels safe at home.    He has been eating pretty well, following his diet,

## 2024-06-14 DIAGNOSIS — E11.9 TYPE 2 DIABETES MELLITUS WITHOUT COMPLICATION, WITHOUT LONG-TERM CURRENT USE OF INSULIN (HCC): ICD-10-CM

## 2024-06-14 DIAGNOSIS — E78.49 OTHER HYPERLIPIDEMIA: ICD-10-CM

## 2024-06-15 RX ORDER — METFORMIN HYDROCHLORIDE 500 MG/1
500 TABLET, EXTENDED RELEASE ORAL
Qty: 90 TABLET | Refills: 4 | Status: SHIPPED | OUTPATIENT
Start: 2024-06-15

## 2024-06-15 RX ORDER — ROSUVASTATIN CALCIUM 10 MG/1
10 TABLET, COATED ORAL DAILY
Qty: 90 TABLET | Refills: 4 | Status: SHIPPED | OUTPATIENT
Start: 2024-06-15

## 2024-06-15 RX ORDER — ALLOPURINOL 100 MG/1
100 TABLET ORAL DAILY
Qty: 90 TABLET | Refills: 4 | Status: SHIPPED | OUTPATIENT
Start: 2024-06-15

## 2024-11-04 ENCOUNTER — OFFICE VISIT (OUTPATIENT)
Age: 69
End: 2024-11-04
Payer: MEDICARE

## 2024-11-04 VITALS
HEART RATE: 88 BPM | DIASTOLIC BLOOD PRESSURE: 86 MMHG | OXYGEN SATURATION: 97 % | SYSTOLIC BLOOD PRESSURE: 137 MMHG | HEIGHT: 69 IN | WEIGHT: 240.2 LBS | BODY MASS INDEX: 35.58 KG/M2 | RESPIRATION RATE: 14 BRPM

## 2024-11-04 DIAGNOSIS — E11.9 TYPE 2 DIABETES MELLITUS WITHOUT COMPLICATION, WITHOUT LONG-TERM CURRENT USE OF INSULIN (HCC): ICD-10-CM

## 2024-11-04 DIAGNOSIS — M1A.09X0 IDIOPATHIC CHRONIC GOUT OF MULTIPLE SITES WITHOUT TOPHUS: ICD-10-CM

## 2024-11-04 DIAGNOSIS — R03.0 ELEVATED BLOOD PRESSURE READING IN OFFICE WITHOUT DIAGNOSIS OF HYPERTENSION: ICD-10-CM

## 2024-11-04 DIAGNOSIS — E11.9 TYPE 2 DIABETES MELLITUS WITHOUT COMPLICATION, WITHOUT LONG-TERM CURRENT USE OF INSULIN (HCC): Primary | ICD-10-CM

## 2024-11-04 PROCEDURE — 99214 OFFICE O/P EST MOD 30 MIN: CPT | Performed by: INTERNAL MEDICINE

## 2024-11-04 ASSESSMENT — PATIENT HEALTH QUESTIONNAIRE - PHQ9
2. FEELING DOWN, DEPRESSED OR HOPELESS: NOT AT ALL
SUM OF ALL RESPONSES TO PHQ QUESTIONS 1-9: 0
SUM OF ALL RESPONSES TO PHQ QUESTIONS 1-9: 0
1. LITTLE INTEREST OR PLEASURE IN DOING THINGS: NOT AT ALL
SUM OF ALL RESPONSES TO PHQ9 QUESTIONS 1 & 2: 0
SUM OF ALL RESPONSES TO PHQ QUESTIONS 1-9: 0
SUM OF ALL RESPONSES TO PHQ QUESTIONS 1-9: 0

## 2024-11-04 NOTE — PATIENT INSTRUCTIONS
I recommend you have updated COVID and flu vaccines this fall, at the pharmacy.       When checking your blood pressure at home: you should sit for five minutes with your feet on the floor and your back supported and an empty bladder, prior to checking your blood pressure.     Send me the blood pressure readings after a couple weeks.  The goal is to be under 130/80 at home.    Check your blood pressure at different times of the day and write down the readings.   Bring your BP machine and the readings to me for your  next appointment.

## 2024-11-04 NOTE — PROGRESS NOTES
A/P:    1. Type 2 diabetes mellitus without complication, without long-term current use of insulin (HCC)  Previously controlled on current regimen, with A1C of 6.4%. I will recommend continuing current medications and diabetic diet, along with regular exercise, pending labs. Dilated eye exam is up to date.   - Basic Metabolic Panel; Future  - Hemoglobin A1C; Future    2. Idiopathic chronic gout of multiple sites without tophus  Tolerating allopurinol, no recent gout flares.  Continue current dose of allopurinol, pending labs.  - Uric Acid; Future    3. Elevated blood pressure reading in office without diagnosis of hypertension  Again with elevated blood pressure on repeat today.  I asked him to check his blood pressure frequently at home, different times of the day, and bring his blood pressure machine to his follow-up.  He should send readings to me from home in the next 2 weeks.         Follow up:  6 months for AWV          An electronic signature was used to authenticate this note.    --Elizabeth Godinez MD         HPI: Garfield Chambers is here for a follow up visit:      Diabetes: Mr. Chambers has been taking the metformin consistently. Diet has been stable. Last dilated eye exam was in February.      Gout: He has not had any gout flares, is taking allopurinol daily.     He has started walking 1.5 miles daily, and feels good, but has not seen any weight loss. BP was elevated earlier today at his pulmonary/sleep appointment.         Current Outpatient Medications:     allopurinol (ZYLOPRIM) 100 MG tablet, Take 1 tablet by mouth daily, Disp: 90 tablet, Rfl: 4    rosuvastatin (CRESTOR) 10 MG tablet, Take 1 tablet by mouth daily, Disp: 90 tablet, Rfl: 4    metFORMIN (GLUCOPHAGE-XR) 500 MG extended release tablet, Take 1 tablet by mouth daily (with breakfast), Disp: 90 tablet, Rfl: 4    Allergies   Allergen Reactions    Oxycodone-Acetaminophen Hives        /86 (Site: Left Upper Arm, Position: Sitting, Cuff Size:

## 2024-11-05 LAB
ANION GAP SERPL CALC-SCNC: 8 MMOL/L (ref 2–12)
BUN SERPL-MCNC: 15 MG/DL (ref 6–20)
BUN/CREAT SERPL: 15 (ref 12–20)
CALCIUM SERPL-MCNC: 9.6 MG/DL (ref 8.5–10.1)
CHLORIDE SERPL-SCNC: 107 MMOL/L (ref 97–108)
CO2 SERPL-SCNC: 25 MMOL/L (ref 21–32)
CREAT SERPL-MCNC: 0.97 MG/DL (ref 0.7–1.3)
EST. AVERAGE GLUCOSE BLD GHB EST-MCNC: 148 MG/DL
GLUCOSE SERPL-MCNC: 118 MG/DL (ref 65–100)
HBA1C MFR BLD: 6.8 % (ref 4–5.6)
POTASSIUM SERPL-SCNC: 4.1 MMOL/L (ref 3.5–5.1)
SODIUM SERPL-SCNC: 140 MMOL/L (ref 136–145)
URATE SERPL-MCNC: 6.6 MG/DL (ref 3.5–7.2)

## 2024-12-23 ENCOUNTER — TELEPHONE (OUTPATIENT)
Facility: CLINIC | Age: 69
End: 2024-12-23

## 2024-12-23 NOTE — TELEPHONE ENCOUNTER
Patient's wife, Kamilla Chambers (HIPAA verified) and Patient called with questions regarding ongoing cough. Ms. Chambers stated that the cough has been ongoing since last Wednesday, December 18, 2024. She states that her  has gone through a whole bottle of Robitussin and still has the lingering cough. He describes the cough as dry and non productive however he feels like he does have phlegm in his chest that he needs to cough up. Patient states that he also experiencing a headache and sore throat due to coughing so much and some slight body aches. He denies any fever.  And Ms. Chambers would like to know if there are any suggestion to aid in the cough. Both advised that Dr. Godinez is not in the office this week, however she is checking her messages. They would like to know if there is any OTC medications that they may try first before bothering Dr. Godinez. I suggested they may try guaifenesin with the tylenol that he has been taking for his headache to loosen anything in his chest so that he may be able to have a productive cough. Patient also advised to increase his water intake to aid in loosening up any secretions in his chest and make it easier for him to get up. I educated both  And Mrs. Chambers on noting the color of any phlegm that did come up so that I can make note of it for Dr. Godinez and to let me know this afternoon if the medication was working for Mr. Chambers or not so that I am able to send a message to Dr. Godinez. Patient agreed and expressed understanding and agreed to contact the office if symptoms worsened or if they had any further questions or concerns.  And Mrs. Chambers we appreciative of the telephone encounter.    Kourtney \"Grant\" HETAL Johnson

## 2025-04-24 ENCOUNTER — TELEPHONE (OUTPATIENT)
Facility: CLINIC | Age: 70
End: 2025-04-24

## 2025-04-24 SDOH — HEALTH STABILITY: PHYSICAL HEALTH: ON AVERAGE, HOW MANY MINUTES DO YOU ENGAGE IN EXERCISE AT THIS LEVEL?: 150+ MIN

## 2025-04-24 SDOH — HEALTH STABILITY: PHYSICAL HEALTH: ON AVERAGE, HOW MANY DAYS PER WEEK DO YOU ENGAGE IN MODERATE TO STRENUOUS EXERCISE (LIKE A BRISK WALK)?: 7 DAYS

## 2025-04-24 ASSESSMENT — LIFESTYLE VARIABLES
HOW OFTEN DURING THE LAST YEAR HAVE YOU BEEN UNABLE TO REMEMBER WHAT HAPPENED THE NIGHT BEFORE BECAUSE YOU HAD BEEN DRINKING: NEVER
HOW OFTEN DURING THE LAST YEAR HAVE YOU NEEDED AN ALCOHOLIC DRINK FIRST THING IN THE MORNING TO GET YOURSELF GOING AFTER A NIGHT OF HEAVY DRINKING: NEVER
HOW MANY STANDARD DRINKS CONTAINING ALCOHOL DO YOU HAVE ON A TYPICAL DAY: 1 OR 2
HOW MANY STANDARD DRINKS CONTAINING ALCOHOL DO YOU HAVE ON A TYPICAL DAY: 1
HOW OFTEN DO YOU HAVE A DRINK CONTAINING ALCOHOL: 5
HAS A RELATIVE, FRIEND, DOCTOR, OR ANOTHER HEALTH PROFESSIONAL EXPRESSED CONCERN ABOUT YOUR DRINKING OR SUGGESTED YOU CUT DOWN: NO
HOW OFTEN DURING THE LAST YEAR HAVE YOU FOUND THAT YOU WERE NOT ABLE TO STOP DRINKING ONCE YOU HAD STARTED: NEVER
HOW OFTEN DURING THE LAST YEAR HAVE YOU FAILED TO DO WHAT WAS NORMALLY EXPECTED FROM YOU BECAUSE OF DRINKING: NEVER
HAVE YOU OR SOMEONE ELSE BEEN INJURED AS A RESULT OF YOUR DRINKING: NO
HOW OFTEN DURING THE LAST YEAR HAVE YOU BEEN UNABLE TO REMEMBER WHAT HAPPENED THE NIGHT BEFORE BECAUSE YOU HAD BEEN DRINKING: NEVER
HOW OFTEN DURING THE LAST YEAR HAVE YOU FAILED TO DO WHAT WAS NORMALLY EXPECTED FROM YOU BECAUSE OF DRINKING: NEVER
HOW OFTEN DO YOU HAVE A DRINK CONTAINING ALCOHOL: 4 OR MORE TIMES A WEEK
HOW OFTEN DURING THE LAST YEAR HAVE YOU NEEDED AN ALCOHOLIC DRINK FIRST THING IN THE MORNING TO GET YOURSELF GOING AFTER A NIGHT OF HEAVY DRINKING: NEVER
HAVE YOU OR SOMEONE ELSE BEEN INJURED AS A RESULT OF YOUR DRINKING: NO
HOW OFTEN DURING THE LAST YEAR HAVE YOU HAD A FEELING OF GUILT OR REMORSE AFTER DRINKING: NEVER
HOW OFTEN DO YOU HAVE SIX OR MORE DRINKS ON ONE OCCASION: 1
HOW OFTEN DURING THE LAST YEAR HAVE YOU HAD A FEELING OF GUILT OR REMORSE AFTER DRINKING: NEVER
HOW OFTEN DURING THE LAST YEAR HAVE YOU FOUND THAT YOU WERE NOT ABLE TO STOP DRINKING ONCE YOU HAD STARTED: NEVER
HAS A RELATIVE, FRIEND, DOCTOR, OR ANOTHER HEALTH PROFESSIONAL EXPRESSED CONCERN ABOUT YOUR DRINKING OR SUGGESTED YOU CUT DOWN: NO

## 2025-04-24 ASSESSMENT — PATIENT HEALTH QUESTIONNAIRE - PHQ9
SUM OF ALL RESPONSES TO PHQ QUESTIONS 1-9: 0
2. FEELING DOWN, DEPRESSED OR HOPELESS: NOT AT ALL
1. LITTLE INTEREST OR PLEASURE IN DOING THINGS: NOT AT ALL
SUM OF ALL RESPONSES TO PHQ QUESTIONS 1-9: 0

## 2025-05-01 ENCOUNTER — TELEPHONE (OUTPATIENT)
Facility: CLINIC | Age: 70
End: 2025-05-01

## 2025-05-01 NOTE — TELEPHONE ENCOUNTER
Please clarify the notes for this appointment 5/5/25--it says he is coming in for his 1 pm appointment at the time of his wife's appointment at 9 am the same day. ??

## 2025-05-04 SDOH — ECONOMIC STABILITY: TRANSPORTATION INSECURITY
IN THE PAST 12 MONTHS, HAS LACK OF TRANSPORTATION KEPT YOU FROM MEETINGS, WORK, OR FROM GETTING THINGS NEEDED FOR DAILY LIVING?: NO

## 2025-05-04 SDOH — ECONOMIC STABILITY: FOOD INSECURITY: WITHIN THE PAST 12 MONTHS, YOU WORRIED THAT YOUR FOOD WOULD RUN OUT BEFORE YOU GOT MONEY TO BUY MORE.: NEVER TRUE

## 2025-05-04 SDOH — ECONOMIC STABILITY: INCOME INSECURITY: IN THE LAST 12 MONTHS, WAS THERE A TIME WHEN YOU WERE NOT ABLE TO PAY THE MORTGAGE OR RENT ON TIME?: NO

## 2025-05-04 SDOH — ECONOMIC STABILITY: FOOD INSECURITY: WITHIN THE PAST 12 MONTHS, THE FOOD YOU BOUGHT JUST DIDN'T LAST AND YOU DIDN'T HAVE MONEY TO GET MORE.: NEVER TRUE

## 2025-05-04 SDOH — ECONOMIC STABILITY: TRANSPORTATION INSECURITY
IN THE PAST 12 MONTHS, HAS THE LACK OF TRANSPORTATION KEPT YOU FROM MEDICAL APPOINTMENTS OR FROM GETTING MEDICATIONS?: NO

## 2025-05-05 ENCOUNTER — OFFICE VISIT (OUTPATIENT)
Facility: CLINIC | Age: 70
End: 2025-05-05
Payer: MEDICARE

## 2025-05-05 VITALS
HEART RATE: 94 BPM | HEIGHT: 69 IN | OXYGEN SATURATION: 96 % | BODY MASS INDEX: 34.93 KG/M2 | DIASTOLIC BLOOD PRESSURE: 87 MMHG | WEIGHT: 235.8 LBS | SYSTOLIC BLOOD PRESSURE: 139 MMHG

## 2025-05-05 DIAGNOSIS — E78.49 OTHER HYPERLIPIDEMIA: ICD-10-CM

## 2025-05-05 DIAGNOSIS — Z00.00 MEDICARE ANNUAL WELLNESS VISIT, SUBSEQUENT: Primary | ICD-10-CM

## 2025-05-05 DIAGNOSIS — M1A.09X0 IDIOPATHIC CHRONIC GOUT OF MULTIPLE SITES WITHOUT TOPHUS: ICD-10-CM

## 2025-05-05 DIAGNOSIS — Z12.5 PROSTATE CANCER SCREENING: ICD-10-CM

## 2025-05-05 DIAGNOSIS — E11.9 TYPE 2 DIABETES MELLITUS WITHOUT COMPLICATION, WITHOUT LONG-TERM CURRENT USE OF INSULIN (HCC): ICD-10-CM

## 2025-05-05 PROCEDURE — 1126F AMNT PAIN NOTED NONE PRSNT: CPT | Performed by: INTERNAL MEDICINE

## 2025-05-05 PROCEDURE — G8417 CALC BMI ABV UP PARAM F/U: HCPCS | Performed by: INTERNAL MEDICINE

## 2025-05-05 PROCEDURE — 3046F HEMOGLOBIN A1C LEVEL >9.0%: CPT | Performed by: INTERNAL MEDICINE

## 2025-05-05 PROCEDURE — G8427 DOCREV CUR MEDS BY ELIG CLIN: HCPCS | Performed by: INTERNAL MEDICINE

## 2025-05-05 PROCEDURE — 1159F MED LIST DOCD IN RCRD: CPT | Performed by: INTERNAL MEDICINE

## 2025-05-05 PROCEDURE — 1160F RVW MEDS BY RX/DR IN RCRD: CPT | Performed by: INTERNAL MEDICINE

## 2025-05-05 PROCEDURE — 2022F DILAT RTA XM EVC RTNOPTHY: CPT | Performed by: INTERNAL MEDICINE

## 2025-05-05 PROCEDURE — 3017F COLORECTAL CA SCREEN DOC REV: CPT | Performed by: INTERNAL MEDICINE

## 2025-05-05 PROCEDURE — 1123F ACP DISCUSS/DSCN MKR DOCD: CPT | Performed by: INTERNAL MEDICINE

## 2025-05-05 PROCEDURE — 1036F TOBACCO NON-USER: CPT | Performed by: INTERNAL MEDICINE

## 2025-05-05 PROCEDURE — 99214 OFFICE O/P EST MOD 30 MIN: CPT | Performed by: INTERNAL MEDICINE

## 2025-05-05 PROCEDURE — G0439 PPPS, SUBSEQ VISIT: HCPCS | Performed by: INTERNAL MEDICINE

## 2025-05-05 RX ORDER — INDOMETHACIN 50 MG/1
50 CAPSULE ORAL 3 TIMES DAILY
Qty: 30 CAPSULE | Refills: 0 | Status: SHIPPED | OUTPATIENT
Start: 2025-05-05

## 2025-05-05 NOTE — PATIENT INSTRUCTIONS
Try the following for Audiology:     [facility]  [address]  [address]  [place] [address]  [phone]    [facility]  [address]  [place] [address] [phone]         I recommend the Shingrix, newest shingles vaccine. Depending on your insurance, this may be covered best at the pharmacy.     Please bring in a copy of your advanced medical directive/living will so we and the hospital will have it on file.            Hearing Loss: Care Instructions  Overview     Hearing loss is a sudden or slow decrease in how well you hear. It can range from slight to profound. Permanent hearing loss can occur with aging. It also can happen when you are exposed long-term to loud noise. Examples include listening to loud music, riding motorcycles, or being around other loud machines.  Hearing loss can affect your work and home life. It can make you feel lonely or depressed. You may feel that you have lost your independence. But hearing aids and other devices can help you hear better and feel connected to others.  Follow-up care is a key part of your treatment and safety. Be sure to make and go to all appointments, and call your doctor if you are having problems. It's also a good idea to know your test results and keep a list of the medicines you take.  How can you care for yourself at home?  Avoid loud noises whenever possible. This helps keep your hearing from getting worse.  Always wear hearing protection around loud noises.  Wear a hearing aid as directed.  A professional can help you pick a hearing aid that will work best for you.  You can also get hearing aids over the counter for mild to moderate hearing loss.  Have hearing tests as your doctor suggests. They can show whether your hearing has changed. Your hearing aid may need to be adjusted.  Use other devices as needed. These may include:  Telephone amplifiers and hearing aids that can connect to a television, stereo,

## 2025-05-05 NOTE — PROGRESS NOTES
Medicare Annual Wellness Visit    Garfield Chambers is here for Medicare AWV    Assessment & Plan     1. Medicare annual wellness visit, subsequent  We discussed the benefits of advanced care planning today. I gave the patient an information packet and instructions for instituting an advanced medical directive.  Most immunizations are up to date; Shingrix recommended. Colonoscopy is due again in 2031. Continue to follow up here every six months, at least, and with specialists as directed.     2. Prostate cancer screening  We discussed the benefits and limitations of prostate cancer screening. He has decided to have a PSA with labs.   - PSA Screening; Future    3. Type 2 diabetes mellitus without complication, without long-term current use of insulin (HCC)  Previously controlled on current regimen, with A1C of 6.8%. I will recommend continuing current medications and diabetic diet, along with regular exercise, pending labs. Dilated eye exam is up to date.   -  DIABETES FOOT EXAM; Future  - CBC with Auto Differential; Future  - Lipid Panel; Future  - Comprehensive Metabolic Panel; Future  - Albumin/Creatinine Ratio, Urine; Future  - Hemoglobin A1C; Future    4. Other hyperlipidemia  Tolerating statin therapy. Will continue the current regimen, pending labs.   - Lipid Panel; Future  - Comprehensive Metabolic Panel; Future    5. Idiopathic chronic gout of multiple sites without tophus  Recent flare.  Will obtain uric acid level with labs and adjust allopurinol dose as needed.  He may use indomethacin as needed for flares.  - Comprehensive Metabolic Panel; Future  - Uric Acid; Future  - indomethacin (INDOCIN) 50 MG capsule; Take 1 capsule by mouth 3 times daily Prn gout attack--1-2 days  Dispense: 30 capsule; Refill: 0            Return in about 6 months (around 11/5/2025) for diabetes.     Subjective         Garfield Chambers is here for a wellness visit.    Health maintenance: Mr. Chambers has been seen by the following

## 2025-05-06 LAB
ALBUMIN SERPL-MCNC: 4.1 G/DL (ref 3.5–5)
ALBUMIN/GLOB SERPL: 1.2 (ref 1.1–2.2)
ALP SERPL-CCNC: 98 U/L (ref 45–117)
ALT SERPL-CCNC: 32 U/L (ref 12–78)
ANION GAP SERPL CALC-SCNC: 9 MMOL/L (ref 2–12)
AST SERPL-CCNC: 23 U/L (ref 15–37)
BASOPHILS # BLD: 0.03 K/UL (ref 0–0.1)
BASOPHILS NFR BLD: 0.4 % (ref 0–1)
BILIRUB SERPL-MCNC: 0.4 MG/DL (ref 0.2–1)
BUN SERPL-MCNC: 16 MG/DL (ref 6–20)
BUN/CREAT SERPL: 18 (ref 12–20)
CALCIUM SERPL-MCNC: 9.6 MG/DL (ref 8.5–10.1)
CHLORIDE SERPL-SCNC: 107 MMOL/L (ref 97–108)
CHOLEST SERPL-MCNC: 100 MG/DL
CO2 SERPL-SCNC: 23 MMOL/L (ref 21–32)
CREAT SERPL-MCNC: 0.91 MG/DL (ref 0.7–1.3)
CREAT UR-MCNC: 84.1 MG/DL
DIFFERENTIAL METHOD BLD: NORMAL
EOSINOPHIL # BLD: 0.28 K/UL (ref 0–0.4)
EOSINOPHIL NFR BLD: 4 % (ref 0–7)
ERYTHROCYTE [DISTWIDTH] IN BLOOD BY AUTOMATED COUNT: 12.5 % (ref 11.5–14.5)
EST. AVERAGE GLUCOSE BLD GHB EST-MCNC: 206 MG/DL
GLOBULIN SER CALC-MCNC: 3.5 G/DL (ref 2–4)
GLUCOSE SERPL-MCNC: 222 MG/DL (ref 65–100)
HBA1C MFR BLD: 8.8 % (ref 4–5.6)
HCT VFR BLD AUTO: 41.5 % (ref 36.6–50.3)
HDLC SERPL-MCNC: 42 MG/DL
HDLC SERPL: 2.4 (ref 0–5)
HGB BLD-MCNC: 14.3 G/DL (ref 12.1–17)
IMM GRANULOCYTES # BLD AUTO: 0.02 K/UL (ref 0–0.04)
IMM GRANULOCYTES NFR BLD AUTO: 0.3 % (ref 0–0.5)
LDLC SERPL CALC-MCNC: 32 MG/DL (ref 0–100)
LYMPHOCYTES # BLD: 2.01 K/UL (ref 0.8–3.5)
LYMPHOCYTES NFR BLD: 28.7 % (ref 12–49)
MCH RBC QN AUTO: 31 PG (ref 26–34)
MCHC RBC AUTO-ENTMCNC: 34.5 G/DL (ref 30–36.5)
MCV RBC AUTO: 90 FL (ref 80–99)
MICROALBUMIN UR-MCNC: 0.67 MG/DL
MICROALBUMIN/CREAT UR-RTO: 8 MG/G (ref 0–30)
MONOCYTES # BLD: 0.52 K/UL (ref 0–1)
MONOCYTES NFR BLD: 7.4 % (ref 5–13)
NEUTS SEG # BLD: 4.14 K/UL (ref 1.8–8)
NEUTS SEG NFR BLD: 59.2 % (ref 32–75)
NRBC # BLD: 0 K/UL (ref 0–0.01)
NRBC BLD-RTO: 0 PER 100 WBC
PLATELET # BLD AUTO: 281 K/UL (ref 150–400)
PMV BLD AUTO: 10.3 FL (ref 8.9–12.9)
POTASSIUM SERPL-SCNC: 4 MMOL/L (ref 3.5–5.1)
PROT SERPL-MCNC: 7.6 G/DL (ref 6.4–8.2)
PSA SERPL-MCNC: 0.3 NG/ML (ref 0.01–4)
RBC # BLD AUTO: 4.61 M/UL (ref 4.1–5.7)
SODIUM SERPL-SCNC: 139 MMOL/L (ref 136–145)
TRIGL SERPL-MCNC: 130 MG/DL
URATE SERPL-MCNC: 6.6 MG/DL (ref 3.5–7.2)
VLDLC SERPL CALC-MCNC: 26 MG/DL
WBC # BLD AUTO: 7 K/UL (ref 4.1–11.1)

## 2025-05-07 ENCOUNTER — RESULTS FOLLOW-UP (OUTPATIENT)
Facility: CLINIC | Age: 70
End: 2025-05-07

## 2025-05-07 DIAGNOSIS — E11.9 TYPE 2 DIABETES MELLITUS WITHOUT COMPLICATION, WITHOUT LONG-TERM CURRENT USE OF INSULIN (HCC): ICD-10-CM

## 2025-05-07 RX ORDER — METFORMIN HYDROCHLORIDE 500 MG/1
500 TABLET, EXTENDED RELEASE ORAL
Qty: 360 TABLET | Refills: 3 | Status: SHIPPED | OUTPATIENT
Start: 2025-05-07

## 2025-05-07 RX ORDER — ALLOPURINOL 100 MG/1
200 TABLET ORAL DAILY
Qty: 180 TABLET | Refills: 3 | Status: SHIPPED | OUTPATIENT
Start: 2025-05-07

## 2025-05-07 NOTE — TELEPHONE ENCOUNTER
Please call patient: His diabetes control is worse, with an A1C of 8.8%. I would like him to work on a stricter diabetic diet, lower carb and sugar intake.  Continue his regular exercise.      He should increase his metformin dose--ultimately and like him to get up to taking 2 pills twice a day.  He should start with taking 1 pill twice a day if his stomach is doing fine with no diarrhea, then after a week he should increase to 1 pill in the morning and 2 pills with dinner.  In another week he can increase to 2 pills in the morning and 2 pills with dinner as long as he is tolerating it.    Also, his uric acid level is above goal, and with his recent gout flare, I recommend he start taking allopurinol 200 mg once daily to prevent flares.     Prescriptions sent to [facility] pharmacy.     I will put a message on the rest of his labs, he may read it in [employer].

## (undated) DEVICE — DRAPE C ARM W54XL84IN MINI FOR OEC 6800

## (undated) DEVICE — SOL IRRIGATION INJ NACL 0.9% 500ML BTL

## (undated) DEVICE — SYRINGE 50ML E/T

## (undated) DEVICE — CATH IV AUTOGRD BC PNK 20GA 25 -- INSYTE

## (undated) DEVICE — DEVON™ KNEE AND BODY STRAP 60" X 3" (1.5 M X 7.6 CM): Brand: DEVON

## (undated) DEVICE — IMPERVIOUS SURGICAL GOWN, LG: Brand: CONVERTORS

## (undated) DEVICE — BANDAGE COMPR SELF ADH 5 YDX6 IN TAN STRL PREMIERPRO LF

## (undated) DEVICE — MASTISOL ADHESIVE LIQ 2/3ML

## (undated) DEVICE — CUFF RMFG BP INF SZ 11 DISP -- LAWSON OEM ITEM 238915

## (undated) DEVICE — ELECTRODE,RADIOTRANSLUCENT,FOAM,3PK: Brand: MEDLINE

## (undated) DEVICE — SIMPLICITY FLUFF UNDERPAD 23X36, MODERATE: Brand: SIMPLICITY

## (undated) DEVICE — SOLIDIFIER MEDC 1200ML -- CONVERT TO 356117

## (undated) DEVICE — DRESSING GZ FLUF 36X36 IN RND 2 PLY PD GZ AMER WHT CROSS

## (undated) DEVICE — INFECTION CONTROL KIT SYS

## (undated) DEVICE — 3M™ CUROS™ DISINFECTING CAP FOR NEEDLELESS CONNECTORS 270/CARTON 20 CARTONS/CASE CFF1-270: Brand: CUROS™

## (undated) DEVICE — ABDOMINAL PAD: Brand: DERMACEA

## (undated) DEVICE — KERLIX BANDAGE ROLL: Brand: KERLIX

## (undated) DEVICE — STERILE POLYISOPRENE POWDER-FREE SURGICAL GLOVES: Brand: PROTEXIS

## (undated) DEVICE — ARGYLE FRAZIER SURGICAL SUCTION INSTRUMENT 10 FR/CH (3.3 MM): Brand: ARGYLE

## (undated) DEVICE — SET GRAV CK VLV NEEDLESS ST 3 GANGED 4WAY STPCOCK HI FLO 10

## (undated) DEVICE — CURITY NON-ADHERENT STRIPS: Brand: CURITY

## (undated) DEVICE — ROCKER SWITCH PENCIL BLADE ELECTRODE, HOLSTER: Brand: EDGE

## (undated) DEVICE — (D)STRIP SKN CLSR 0.5X4IN WHT --

## (undated) DEVICE — Device

## (undated) DEVICE — SKIN PREP TRAY W/CHG: Brand: MEDLINE INDUSTRIES, INC.

## (undated) DEVICE — MEDI-VAC NON-CONDUCTIVE SUCTION TUBING: Brand: CARDINAL HEALTH

## (undated) DEVICE — SUTURE MCRYL SZ 5-0 L18IN ABSRB UD L13MM P-3 3/8 CIR PRIM Y493G

## (undated) DEVICE — ELECTRODE NDL 2.8IN COAT VALLEYLAB

## (undated) DEVICE — (D)SENSOR RMFG 02 PULS OXMTR -- DISC BY MFR USE ITEM 133445

## (undated) DEVICE — BIPOLAR FORCEPS CORD,BANANA LEADS: Brand: VALLEYLAB

## (undated) DEVICE — 3M™ STERI-DRAPE™ U-DRAPE 1015: Brand: STERI-DRAPE™

## (undated) DEVICE — REM POLYHESIVE ADULT PATIENT RETURN ELECTRODE: Brand: VALLEYLAB

## (undated) DEVICE — SUTURE VCRL SZ 4-0 L18IN ABSRB UD L13MM P-3 3/8 CIR PRIM J494H

## (undated) DEVICE — TIP SUCT BLU PLAS BLB W/O CTRL VENT YANK

## (undated) DEVICE — LIGHT HANDLE: Brand: DEVON

## (undated) DEVICE — 1200 GUARD II KIT W/5MM TUBE W/O VAC TUBE: Brand: GUARDIAN

## (undated) DEVICE — ZIMMER® STERILE DISPOSABLE TOURNIQUET CUFF WITH PROTECTIVE SLEEVE AND PLC, DUAL PORT, SINGLE BLADDER, 18 IN. (46 CM)

## (undated) DEVICE — DRAPE,U/ SHT,SPLIT,PLAS,STERIL: Brand: MEDLINE

## (undated) DEVICE — KIT COLON W/ 1.1OZ LUB AND 2 END

## (undated) DEVICE — CANN NASAL O2 CAPNOGRAPHY AD -- FILTERLINE

## (undated) DEVICE — HAND I-LF: Brand: MEDLINE INDUSTRIES, INC.

## (undated) DEVICE — SPONGE LAP 18X18IN STRL -- 5/PK

## (undated) DEVICE — DRAPE,REIN 53X77,STERILE: Brand: MEDLINE

## (undated) DEVICE — APPLIER CLP LIG SM TI PREM SURGCLP SUPER INTLOK 20 DISP

## (undated) DEVICE — BAG BELONG PT PERS CLEAR HANDL